# Patient Record
Sex: MALE | Race: WHITE | NOT HISPANIC OR LATINO | URBAN - METROPOLITAN AREA
[De-identification: names, ages, dates, MRNs, and addresses within clinical notes are randomized per-mention and may not be internally consistent; named-entity substitution may affect disease eponyms.]

---

## 2021-06-07 ENCOUNTER — OUTPATIENT (OUTPATIENT)
Dept: OUTPATIENT SERVICES | Facility: HOSPITAL | Age: 46
LOS: 1 days | End: 2021-06-07
Payer: COMMERCIAL

## 2021-06-07 VITALS
SYSTOLIC BLOOD PRESSURE: 142 MMHG | RESPIRATION RATE: 16 BRPM | WEIGHT: 257.06 LBS | TEMPERATURE: 98 F | HEART RATE: 66 BPM | OXYGEN SATURATION: 100 % | DIASTOLIC BLOOD PRESSURE: 100 MMHG | HEIGHT: 69 IN

## 2021-06-07 DIAGNOSIS — I10 ESSENTIAL (PRIMARY) HYPERTENSION: ICD-10-CM

## 2021-06-07 DIAGNOSIS — E11.9 TYPE 2 DIABETES MELLITUS WITHOUT COMPLICATIONS: ICD-10-CM

## 2021-06-07 DIAGNOSIS — Z01.818 ENCOUNTER FOR OTHER PREPROCEDURAL EXAMINATION: ICD-10-CM

## 2021-06-07 DIAGNOSIS — Z96.641 PRESENCE OF RIGHT ARTIFICIAL HIP JOINT: Chronic | ICD-10-CM

## 2021-06-07 DIAGNOSIS — M16.10 UNILATERAL PRIMARY OSTEOARTHRITIS, UNSPECIFIED HIP: ICD-10-CM

## 2021-06-07 DIAGNOSIS — M16.12 UNILATERAL PRIMARY OSTEOARTHRITIS, LEFT HIP: ICD-10-CM

## 2021-06-07 DIAGNOSIS — Q89.2 CONGENITAL MALFORMATIONS OF OTHER ENDOCRINE GLANDS: Chronic | ICD-10-CM

## 2021-06-07 DIAGNOSIS — Z29.9 ENCOUNTER FOR PROPHYLACTIC MEASURES, UNSPECIFIED: ICD-10-CM

## 2021-06-07 DIAGNOSIS — Z98.890 OTHER SPECIFIED POSTPROCEDURAL STATES: Chronic | ICD-10-CM

## 2021-06-07 DIAGNOSIS — G47.33 OBSTRUCTIVE SLEEP APNEA (ADULT) (PEDIATRIC): ICD-10-CM

## 2021-06-07 DIAGNOSIS — Z98.89 OTHER SPECIFIED POSTPROCEDURAL STATES: Chronic | ICD-10-CM

## 2021-06-07 LAB
A1C WITH ESTIMATED AVERAGE GLUCOSE RESULT: 5.6 % — SIGNIFICANT CHANGE UP (ref 4–5.6)
ESTIMATED AVERAGE GLUCOSE: 114 MG/DL — SIGNIFICANT CHANGE UP (ref 68–114)
TSH SERPL-MCNC: 1.3 UU/ML — SIGNIFICANT CHANGE UP (ref 0.34–4.82)

## 2021-06-07 PROCEDURE — 84443 ASSAY THYROID STIM HORMONE: CPT

## 2021-06-07 PROCEDURE — G0463: CPT

## 2021-06-07 PROCEDURE — 87641 MR-STAPH DNA AMP PROBE: CPT

## 2021-06-07 PROCEDURE — 83036 HEMOGLOBIN GLYCOSYLATED A1C: CPT

## 2021-06-07 PROCEDURE — 87640 STAPH A DNA AMP PROBE: CPT

## 2021-06-07 PROCEDURE — 36415 COLL VENOUS BLD VENIPUNCTURE: CPT

## 2021-06-07 NOTE — H&P PST ADULT - BLOOD AVOIDANCE/RESTRICTIONS, PROFILE
patient concern about blood borne infection enrolled in Bloodless program, HCP in chart, Surgeon aware, refusal of blood transfusion form given to pt/patient concern about blood borne infection

## 2021-06-07 NOTE — H&P PST ADULT - NEGATIVE NEUROLOGICAL SYMPTOMS
no transient paralysis/no weakness/no paresthesias/no generalized seizures/no focal seizures/no syncope/no tremors/no vertigo/no loss of sensation/no headache/no loss of consciousness/no hemiparesis/no confusion

## 2021-06-07 NOTE — H&P PST ADULT - ACTIVITY
walking 2 blocks w/o SOB,  ADLs, climb 2 flight of stairs walking 2 blocks w/o SOB,  ADLs, climb 2 flight of stairs w/o SOB

## 2021-06-07 NOTE — H&P PST ADULT - ASSESSMENT
45 years old male presented to PST , c/o of left hip pain related to injuries sustained at work, pt works on [employer]. Pt hopes that after surgery his mobility and condition of life will improve and again will be able to participate in activities. Patient was diagnosed with unilateral primary osteoarthritis, left hip and is scheduled for left total gip replacement on 06/14/2021.  45 years old male presented  with unilateral primary osteoarthritis, left hip .

## 2021-06-07 NOTE — H&P PST ADULT - ENDOCRINE COMMENTS
Grave's disease, checked TSH by PCP , no need for thyroid medications, hx of thymus hyperplasia, mass sx removed

## 2021-06-07 NOTE — H&P PST ADULT - NSANTHOSAYNRD_GEN_A_CORE
Yes dx 01/2021, had second/Yes dx 01/2021, had second sleep studies 05/2021 for CPAP setting just recently,/Yes

## 2021-06-07 NOTE — H&P PST ADULT - NSICDXPASTMEDICALHX_GEN_ALL_CORE_FT
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    Type 2 diabetes mellitus     Unilateral primary osteoarthritis, right hip hx of sx done     PAST MEDICAL HISTORY:  Asthma diagnosed 2003, never needed use Albuterol, never hospitalized or intubated    Chronic GERD     DVT, lower extremity right leg - was on  Xarelto - 2016 arthroscopy of knee    Dyspnea on exertion     Graves' disease TSH checked today, pt is not taking any thyroid meds    Hypertension     Lower back pain     Obesity BMI-38    PAOLA (obstructive sleep apnea) dx 01/2021, had second sleep studies 05/2021 for CPAP setting just recently,awaiting for CPAP , as per pt this is moderate PAOLA, will obtain sleep studies before sx,contact [facility] [place] [phone] to notify OR booking    Prediabetes ???    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

## 2021-06-07 NOTE — H&P PST ADULT - NSICDXPROBLEM_GEN_ALL_CORE_FT
PROBLEM DIAGNOSES  Problem: Prophylactic measure  Assessment and Plan:     Problem: Unilateral primary osteoarthritis, left hip  Assessment and Plan:     Problem: Hypertension  Assessment and Plan:     Problem: Type 2 diabetes mellitus  Assessment and Plan:     Problem: PAOLA (obstructive sleep apnea)  Assessment and Plan:        PROBLEM DIAGNOSES  Problem: Prophylactic measure  Assessment and Plan: Preoperative instructions discussed with pt and given to pt. Instructed pt that one person will be allowed to come to hospital with patient and  go with him to 4th floor and after that leave hospital. Patient, to notify security on arrival to the lobby of the hospital that today is day of surgery. Patient notified that will need someone to come to the hospital to  after surgery, not to eat or drink anything after midnight the night before the surgery, to avoid NSAIDs such as Ibuprofen, Motrin, Aleve, Advil, naproxen before surgery, to take Tylenol if needed for pain if no contraindications from PCP, to report exposure to anyone with any contagious diseases including Covid-19 or if patient is exhibiting any symptoms of COVID-19. # bottles of Chlorhexidine 4% soap given to pt. Instructed about use of Chlorhexidine 4% soap before surgery one bottle each morning ( 3 mornings before sx) . Patient verbalized understanding of instructions given.    Problem: Unilateral primary osteoarthritis, left hip  Assessment and Plan: Patient is scheduled for left total gip replacement on 06/14/2021.       Problem: Hypertension  Assessment and Plan: Continue antihypertensive meds and take with sips of water on day of surgery. Follow-up with PCP postop for management.      Problem: PAOLA (obstructive sleep apnea)  Assessment and Plan: dx 01/2021, had second sleep studies 05/2021 for CPAP setting just recently,awaiting for CPAP , as per pt this is moderate PAOLA, will obtain sleep studies before sx,contact [facility] [place] [phone]- to notify OR booking    Problem: Prediabetes  Assessment and Plan: Continue antidiabetic meds and hold on day of surgery. Perioperative glucose monitoring and cover as needed. Follow-up with PCP for diabetic management      Problem: Asthma  Assessment and Plan: Continue use of inhaler and use same on day of surgery. Take montelukast as directed. Follow-up with PCP postop for management      Problem: No blood products  Assessment and Plan: Pt does not wish to have blood transfusion, enrlooed in bloodless program, Dr. Mayers office notifired, HCP in chart, refusal of blood transfusion form given to patient to take home and choose what he can accept or not and bring it back to sign with Surgeon, to notify OR booking, pt verbalized understanding

## 2021-06-07 NOTE — H&P PST ADULT - NSICDXPASTSURGICALHX_GEN_ALL_CORE_FT
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

## 2021-06-07 NOTE — H&P PST ADULT - HISTORY OF PRESENT ILLNESS
accident few years  45 years old male presented to PST , c/o of left hip pain related to injuries sustained at work, pt works on [employer]. Pt hopes that after surgery his mobility and condition of life will improve and again will be able to participate in activities. Patient was diagnosed with unilateral primary osteoarthritis, left hip and is scheduled for left total gip replacement on 06/14/2021.  45 years old male presented to PST , c/o of left hip pain related to injuries sustained at work, pt works on [employer]. Pt hopes that after surgery his mobility and condition of life will improve and again will be able to participate in activities. Patient was diagnosed with unilateral primary osteoarthritis, left hip and is scheduled for left total hip replacement on 06/14/2021.

## 2021-06-07 NOTE — H&P PST ADULT - MUSCULOSKELETAL
detailed exam details… no joint swelling/no joint erythema/no joint warmth/no calf tenderness/decreased ROM due to pain/diminished strength

## 2021-06-08 DIAGNOSIS — E32.0 PERSISTENT HYPERPLASIA OF THYMUS: Chronic | ICD-10-CM

## 2021-06-08 DIAGNOSIS — J45.909 UNSPECIFIED ASTHMA, UNCOMPLICATED: ICD-10-CM

## 2021-06-08 DIAGNOSIS — R73.03 PREDIABETES: ICD-10-CM

## 2021-06-08 DIAGNOSIS — Z78.9 OTHER SPECIFIED HEALTH STATUS: ICD-10-CM

## 2021-06-08 LAB
MRSA PCR RESULT.: SIGNIFICANT CHANGE UP
S AUREUS DNA NOSE QL NAA+PROBE: DETECTED

## 2021-06-09 DIAGNOSIS — Z01.818 ENCOUNTER FOR OTHER PREPROCEDURAL EXAMINATION: ICD-10-CM

## 2021-06-09 PROBLEM — E32.0: Chronic | Status: ACTIVE | Noted: 2021-06-08

## 2021-06-09 PROBLEM — E05.00 THYROTOXICOSIS WITH DIFFUSE GOITER WITHOUT THYROTOXIC CRISIS OR STORM: Chronic | Status: ACTIVE | Noted: 2021-06-07

## 2021-06-09 PROBLEM — I10 ESSENTIAL (PRIMARY) HYPERTENSION: Chronic | Status: ACTIVE | Noted: 2021-06-08

## 2021-06-09 PROBLEM — R73.03 PREDIABETES: Chronic | Status: ACTIVE | Noted: 2021-06-08

## 2021-06-09 PROBLEM — E66.9 OBESITY, UNSPECIFIED: Chronic | Status: ACTIVE | Noted: 2021-06-08

## 2021-06-09 PROBLEM — K21.9 GASTRO-ESOPHAGEAL REFLUX DISEASE WITHOUT ESOPHAGITIS: Chronic | Status: ACTIVE | Noted: 2021-06-08

## 2021-06-09 PROBLEM — R06.00 DYSPNEA, UNSPECIFIED: Chronic | Status: ACTIVE | Noted: 2021-06-08

## 2021-06-09 PROBLEM — I82.409 ACUTE EMBOLISM AND THROMBOSIS OF UNSPECIFIED DEEP VEINS OF UNSPECIFIED LOWER EXTREMITY: Chronic | Status: ACTIVE | Noted: 2021-06-07

## 2021-06-09 PROBLEM — M16.11 UNILATERAL PRIMARY OSTEOARTHRITIS, RIGHT HIP: Chronic | Status: ACTIVE | Noted: 2021-06-07

## 2021-06-09 PROBLEM — M54.5 LOW BACK PAIN: Chronic | Status: ACTIVE | Noted: 2021-06-08

## 2021-06-09 PROBLEM — G47.33 OBSTRUCTIVE SLEEP APNEA (ADULT) (PEDIATRIC): Chronic | Status: ACTIVE | Noted: 2021-06-07

## 2021-06-11 ENCOUNTER — APPOINTMENT (OUTPATIENT)
Dept: DISASTER EMERGENCY | Facility: CLINIC | Age: 46
End: 2021-06-11

## 2021-06-12 ENCOUNTER — APPOINTMENT (OUTPATIENT)
Dept: DISASTER EMERGENCY | Facility: CLINIC | Age: 46
End: 2021-06-12

## 2021-06-13 ENCOUNTER — FORM ENCOUNTER (OUTPATIENT)
Age: 46
End: 2021-06-13

## 2021-06-30 ENCOUNTER — OUTPATIENT (OUTPATIENT)
Dept: OUTPATIENT SERVICES | Facility: HOSPITAL | Age: 46
LOS: 1 days | End: 2021-06-30
Payer: COMMERCIAL

## 2021-06-30 VITALS
DIASTOLIC BLOOD PRESSURE: 86 MMHG | HEART RATE: 62 BPM | RESPIRATION RATE: 17 BRPM | SYSTOLIC BLOOD PRESSURE: 119 MMHG | TEMPERATURE: 99 F | OXYGEN SATURATION: 100 % | WEIGHT: 255.07 LBS | HEIGHT: 69 IN

## 2021-06-30 DIAGNOSIS — Z29.9 ENCOUNTER FOR PROPHYLACTIC MEASURES, UNSPECIFIED: ICD-10-CM

## 2021-06-30 DIAGNOSIS — M16.12 UNILATERAL PRIMARY OSTEOARTHRITIS, LEFT HIP: ICD-10-CM

## 2021-06-30 DIAGNOSIS — Z01.818 ENCOUNTER FOR OTHER PREPROCEDURAL EXAMINATION: ICD-10-CM

## 2021-06-30 DIAGNOSIS — G47.33 OBSTRUCTIVE SLEEP APNEA (ADULT) (PEDIATRIC): ICD-10-CM

## 2021-06-30 DIAGNOSIS — E32.0 PERSISTENT HYPERPLASIA OF THYMUS: Chronic | ICD-10-CM

## 2021-06-30 DIAGNOSIS — Z96.641 PRESENCE OF RIGHT ARTIFICIAL HIP JOINT: Chronic | ICD-10-CM

## 2021-06-30 DIAGNOSIS — M16.10 UNILATERAL PRIMARY OSTEOARTHRITIS, UNSPECIFIED HIP: ICD-10-CM

## 2021-06-30 DIAGNOSIS — I10 ESSENTIAL (PRIMARY) HYPERTENSION: ICD-10-CM

## 2021-06-30 DIAGNOSIS — Z98.890 OTHER SPECIFIED POSTPROCEDURAL STATES: Chronic | ICD-10-CM

## 2021-06-30 LAB
A1C WITH ESTIMATED AVERAGE GLUCOSE RESULT: 5.6 % — SIGNIFICANT CHANGE UP (ref 4–5.6)
APTT BLD: 31.4 SEC — SIGNIFICANT CHANGE UP (ref 27.5–35.5)
BLD GP AB SCN SERPL QL: SIGNIFICANT CHANGE UP
ESTIMATED AVERAGE GLUCOSE: 114 MG/DL — SIGNIFICANT CHANGE UP (ref 68–114)
INR BLD: 0.88 RATIO — SIGNIFICANT CHANGE UP (ref 0.88–1.16)
MRSA PCR RESULT.: SIGNIFICANT CHANGE UP
PROTHROM AB SERPL-ACNC: 10.5 SEC — LOW (ref 10.6–13.6)
S AUREUS DNA NOSE QL NAA+PROBE: DETECTED

## 2021-06-30 PROCEDURE — 86900 BLOOD TYPING SEROLOGIC ABO: CPT

## 2021-06-30 PROCEDURE — 86850 RBC ANTIBODY SCREEN: CPT

## 2021-06-30 PROCEDURE — 87641 MR-STAPH DNA AMP PROBE: CPT

## 2021-06-30 PROCEDURE — 87640 STAPH A DNA AMP PROBE: CPT

## 2021-06-30 PROCEDURE — 36415 COLL VENOUS BLD VENIPUNCTURE: CPT

## 2021-06-30 PROCEDURE — 83036 HEMOGLOBIN GLYCOSYLATED A1C: CPT

## 2021-06-30 PROCEDURE — G0463: CPT

## 2021-06-30 PROCEDURE — 85730 THROMBOPLASTIN TIME PARTIAL: CPT

## 2021-06-30 PROCEDURE — 86901 BLOOD TYPING SEROLOGIC RH(D): CPT

## 2021-06-30 PROCEDURE — 85610 PROTHROMBIN TIME: CPT

## 2021-06-30 RX ORDER — PHENTERMINE HCL 30 MG
1 CAPSULE ORAL
Qty: 0 | Refills: 0 | DISCHARGE

## 2021-06-30 RX ORDER — TRAMADOL HYDROCHLORIDE 50 MG/1
1 TABLET ORAL
Qty: 0 | Refills: 0 | DISCHARGE

## 2021-06-30 RX ORDER — LOSARTAN/HYDROCHLOROTHIAZIDE 100MG-25MG
1 TABLET ORAL
Qty: 0 | Refills: 0 | DISCHARGE

## 2021-06-30 NOTE — H&P PST ADULT - NSICDXPROBLEM_GEN_ALL_CORE_FT
PROBLEM DIAGNOSES  Problem: Need for prophylactic measure  Assessment and Plan: pt instructed on wash chlorhexidine    Problem: HTN (hypertension)  Assessment and Plan: pt to take BP meds am of surgery    Problem: PAOLA (obstructive sleep apnea)  Assessment and Plan: monitor during surgery     Problem: Unilateral primary osteoarthritis, left hip  Assessment and Plan: left total hip replacement

## 2021-06-30 NOTE — H&P PST ADULT - ASSESSMENT
45 yr old male with history of HTN ,asthma, sleep apnea ( no machine yet) had DVT right leg 2016 presents for left total hip replacement on 7/12/2021. Pt wants to recovery at home with physical therapy. His goal is to walk freely and continue all his activity.

## 2021-06-30 NOTE — H&P PST ADULT - HISTORY OF PRESENT ILLNESS
45 yr old male with history of passed Covid exposure HTN GERD Sleep apnea awaiting CPAP machine presents for left hip replacement. Pt had right hip replaced in March 2014 and over time his left hip started to affect his walking. Pt is schedule for left total replacement on 7/12/2021. Pt instructed on the use of chlorhexidene wash. Pt wants to have rehab at home post surgery it was successful for him last surgery.   45 yr old male with history of passed Covid exposure HTN GERD Sleep apnea awaiting CPAP machine DVT 2016 after right knee arthroscopy presents for left hip replacement. Pt had right hip replaced in March 2014 and over time his left hip started to affect his walking. Pt is schedule for left total replacement on 7/12/2021. Pt instructed on the use of chlorhexidene wash. Pt wants to have rehab at home post surgery it was successful for him last surgery.

## 2021-06-30 NOTE — H&P PST ADULT - NSICDXPASTSURGICALHX_GEN_ALL_CORE_FT
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

## 2021-06-30 NOTE — H&P PST ADULT - NSICDXPASTMEDICALHX_GEN_ALL_CORE_FT
PAST MEDICAL HISTORY:  Asthma diagnosed 2003, never needed use Albuterol, never hospitalized or intubated    Chronic GERD     DVT, lower extremity right leg - was on  Xarelto - 2016 arthroscopy of knee    Dyspnea on exertion     Graves' disease TSH checked today, pt is not taking any thyroid meds    Hypertension     Lower back pain     Obesity BMI-38    PAOLA (obstructive sleep apnea) dx 01/2021, had second sleep studies 05/2021 for CPAP setting just recently,awaiting for CPAP , as per pt this is moderate PAOLA, will obtain sleep studies before sx,contact [facility] Sleep [facility] [place] [phone] to notify OR booking    Prediabetes ???    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

## 2021-07-02 RX ORDER — MUPIROCIN 20 MG/G
1 OINTMENT TOPICAL
Qty: 1 | Refills: 0
Start: 2021-07-02 | End: 2021-07-06

## 2021-07-06 PROBLEM — M16.12 UNILATERAL PRIMARY OSTEOARTHRITIS, LEFT HIP: Chronic | Status: ACTIVE | Noted: 2021-06-30
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
## 2021-07-09 ENCOUNTER — APPOINTMENT (OUTPATIENT)
Dept: DISASTER EMERGENCY | Facility: CLINIC | Age: 46
End: 2021-07-09

## 2021-07-09 LAB — SARS-COV-2 N GENE NPH QL NAA+PROBE: NOT DETECTED

## 2021-07-11 ENCOUNTER — FORM ENCOUNTER (OUTPATIENT)
Age: 46
End: 2021-07-11

## 2021-07-12 ENCOUNTER — TRANSCRIPTION ENCOUNTER (OUTPATIENT)
Age: 46
End: 2021-07-12

## 2021-07-12 ENCOUNTER — RESULT REVIEW (OUTPATIENT)
Age: 46
End: 2021-07-12

## 2021-07-12 ENCOUNTER — INPATIENT (INPATIENT)
Facility: HOSPITAL | Age: 46
LOS: 0 days | Discharge: HOME | DRG: 470 | End: 2021-07-12
Attending: ORTHOPAEDIC SURGERY | Admitting: ORTHOPAEDIC SURGERY
Payer: COMMERCIAL

## 2021-07-12 VITALS
SYSTOLIC BLOOD PRESSURE: 132 MMHG | RESPIRATION RATE: 16 BRPM | DIASTOLIC BLOOD PRESSURE: 93 MMHG | OXYGEN SATURATION: 98 % | TEMPERATURE: 98 F | HEART RATE: 75 BPM | HEIGHT: 69 IN | WEIGHT: 255.07 LBS

## 2021-07-12 VITALS
HEART RATE: 57 BPM | SYSTOLIC BLOOD PRESSURE: 96 MMHG | RESPIRATION RATE: 16 BRPM | OXYGEN SATURATION: 98 % | DIASTOLIC BLOOD PRESSURE: 62 MMHG

## 2021-07-12 DIAGNOSIS — M16.10 UNILATERAL PRIMARY OSTEOARTHRITIS, UNSPECIFIED HIP: ICD-10-CM

## 2021-07-12 DIAGNOSIS — E66.9 OBESITY, UNSPECIFIED: ICD-10-CM

## 2021-07-12 DIAGNOSIS — I10 ESSENTIAL (PRIMARY) HYPERTENSION: ICD-10-CM

## 2021-07-12 DIAGNOSIS — Z98.890 OTHER SPECIFIED POSTPROCEDURAL STATES: Chronic | ICD-10-CM

## 2021-07-12 DIAGNOSIS — M25.552 PAIN IN LEFT HIP: ICD-10-CM

## 2021-07-12 DIAGNOSIS — Z01.818 ENCOUNTER FOR OTHER PREPROCEDURAL EXAMINATION: ICD-10-CM

## 2021-07-12 DIAGNOSIS — G47.33 OBSTRUCTIVE SLEEP APNEA (ADULT) (PEDIATRIC): ICD-10-CM

## 2021-07-12 DIAGNOSIS — Z96.641 PRESENCE OF RIGHT ARTIFICIAL HIP JOINT: Chronic | ICD-10-CM

## 2021-07-12 DIAGNOSIS — J45.909 UNSPECIFIED ASTHMA, UNCOMPLICATED: ICD-10-CM

## 2021-07-12 DIAGNOSIS — M16.12 UNILATERAL PRIMARY OSTEOARTHRITIS, LEFT HIP: ICD-10-CM

## 2021-07-12 DIAGNOSIS — E32.0 PERSISTENT HYPERPLASIA OF THYMUS: Chronic | ICD-10-CM

## 2021-07-12 LAB
ANION GAP SERPL CALC-SCNC: 2 MMOL/L — LOW (ref 5–17)
BUN SERPL-MCNC: 12 MG/DL — SIGNIFICANT CHANGE UP (ref 7–18)
CALCIUM SERPL-MCNC: 8.4 MG/DL — SIGNIFICANT CHANGE UP (ref 8.4–10.5)
CHLORIDE SERPL-SCNC: 107 MMOL/L — SIGNIFICANT CHANGE UP (ref 96–108)
CO2 SERPL-SCNC: 31 MMOL/L — SIGNIFICANT CHANGE UP (ref 22–31)
CREAT SERPL-MCNC: 0.93 MG/DL — SIGNIFICANT CHANGE UP (ref 0.5–1.3)
GLUCOSE BLDC GLUCOMTR-MCNC: 107 MG/DL — HIGH (ref 70–99)
GLUCOSE BLDC GLUCOMTR-MCNC: 85 MG/DL — SIGNIFICANT CHANGE UP (ref 70–99)
GLUCOSE SERPL-MCNC: 100 MG/DL — HIGH (ref 70–99)
HCT VFR BLD CALC: 37.8 % — LOW (ref 39–50)
HGB BLD-MCNC: 13.3 G/DL — SIGNIFICANT CHANGE UP (ref 13–17)
MCHC RBC-ENTMCNC: 32.1 PG — SIGNIFICANT CHANGE UP (ref 27–34)
MCHC RBC-ENTMCNC: 35.2 GM/DL — SIGNIFICANT CHANGE UP (ref 32–36)
MCV RBC AUTO: 91.3 FL — SIGNIFICANT CHANGE UP (ref 80–100)
NRBC # BLD: 0 /100 WBCS — SIGNIFICANT CHANGE UP (ref 0–0)
PLATELET # BLD AUTO: 263 K/UL — SIGNIFICANT CHANGE UP (ref 150–400)
POTASSIUM SERPL-MCNC: 3.9 MMOL/L — SIGNIFICANT CHANGE UP (ref 3.5–5.3)
POTASSIUM SERPL-SCNC: 3.9 MMOL/L — SIGNIFICANT CHANGE UP (ref 3.5–5.3)
RBC # BLD: 4.14 M/UL — LOW (ref 4.2–5.8)
RBC # FLD: 11.7 % — SIGNIFICANT CHANGE UP (ref 10.3–14.5)
SODIUM SERPL-SCNC: 140 MMOL/L — SIGNIFICANT CHANGE UP (ref 135–145)
WBC # BLD: 19.47 K/UL — HIGH (ref 3.8–10.5)
WBC # FLD AUTO: 19.47 K/UL — HIGH (ref 3.8–10.5)

## 2021-07-12 PROCEDURE — 99221 1ST HOSP IP/OBS SF/LOW 40: CPT

## 2021-07-12 PROCEDURE — 88311 DECALCIFY TISSUE: CPT

## 2021-07-12 PROCEDURE — 85027 COMPLETE CBC AUTOMATED: CPT

## 2021-07-12 PROCEDURE — 82962 GLUCOSE BLOOD TEST: CPT

## 2021-07-12 PROCEDURE — 88305 TISSUE EXAM BY PATHOLOGIST: CPT

## 2021-07-12 PROCEDURE — 88305 TISSUE EXAM BY PATHOLOGIST: CPT | Mod: 26

## 2021-07-12 PROCEDURE — 72170 X-RAY EXAM OF PELVIS: CPT | Mod: 26

## 2021-07-12 PROCEDURE — 97162 PT EVAL MOD COMPLEX 30 MIN: CPT

## 2021-07-12 PROCEDURE — 36415 COLL VENOUS BLD VENIPUNCTURE: CPT

## 2021-07-12 PROCEDURE — 80048 BASIC METABOLIC PNL TOTAL CA: CPT

## 2021-07-12 PROCEDURE — 72170 X-RAY EXAM OF PELVIS: CPT

## 2021-07-12 PROCEDURE — C1776: CPT

## 2021-07-12 PROCEDURE — 93005 ELECTROCARDIOGRAM TRACING: CPT

## 2021-07-12 PROCEDURE — 88311 DECALCIFY TISSUE: CPT | Mod: 26

## 2021-07-12 RX ORDER — TRAMADOL HYDROCHLORIDE 50 MG/1
50 TABLET ORAL EVERY 8 HOURS
Refills: 0 | Status: DISCONTINUED | OUTPATIENT
Start: 2021-07-12 | End: 2021-07-12

## 2021-07-12 RX ORDER — SODIUM CHLORIDE 9 MG/ML
3 INJECTION INTRAMUSCULAR; INTRAVENOUS; SUBCUTANEOUS EVERY 8 HOURS
Refills: 0 | Status: DISCONTINUED | OUTPATIENT
Start: 2021-07-12 | End: 2021-07-12

## 2021-07-12 RX ORDER — KETOROLAC TROMETHAMINE 30 MG/ML
30 SYRINGE (ML) INJECTION EVERY 6 HOURS
Refills: 0 | Status: DISCONTINUED | OUTPATIENT
Start: 2021-07-12 | End: 2021-07-12

## 2021-07-12 RX ORDER — FERROUS SULFATE 325(65) MG
1 TABLET ORAL
Qty: 28 | Refills: 0
Start: 2021-07-12 | End: 2021-07-25

## 2021-07-12 RX ORDER — ONDANSETRON 8 MG/1
1 TABLET, FILM COATED ORAL
Qty: 21 | Refills: 0
Start: 2021-07-12 | End: 2021-07-18

## 2021-07-12 RX ORDER — POLYETHYLENE GLYCOL 3350 17 G/17G
17 POWDER, FOR SOLUTION ORAL
Qty: 119 | Refills: 0
Start: 2021-07-12 | End: 2021-07-18

## 2021-07-12 RX ORDER — FENTANYL CITRATE 50 UG/ML
25 INJECTION INTRAVENOUS
Refills: 0 | Status: DISCONTINUED | OUTPATIENT
Start: 2021-07-12 | End: 2021-07-12

## 2021-07-12 RX ORDER — CELECOXIB 200 MG/1
1 CAPSULE ORAL
Qty: 0 | Refills: 0 | DISCHARGE

## 2021-07-12 RX ORDER — CELECOXIB 200 MG/1
200 CAPSULE ORAL DAILY
Refills: 0 | Status: DISCONTINUED | OUTPATIENT
Start: 2021-07-13 | End: 2021-07-12

## 2021-07-12 RX ORDER — DEXTROSE 50 % IN WATER 50 %
25 SYRINGE (ML) INTRAVENOUS ONCE
Refills: 0 | Status: DISCONTINUED | OUTPATIENT
Start: 2021-07-12 | End: 2021-07-12

## 2021-07-12 RX ORDER — BUDESONIDE AND FORMOTEROL FUMARATE DIHYDRATE 160; 4.5 UG/1; UG/1
2 AEROSOL RESPIRATORY (INHALATION)
Refills: 0 | Status: DISCONTINUED | OUTPATIENT
Start: 2021-07-12 | End: 2021-07-12

## 2021-07-12 RX ORDER — GABAPENTIN 400 MG/1
100 CAPSULE ORAL THREE TIMES A DAY
Refills: 0 | Status: DISCONTINUED | OUTPATIENT
Start: 2021-07-12 | End: 2021-07-12

## 2021-07-12 RX ORDER — FERROUS SULFATE 325(65) MG
325 TABLET ORAL DAILY
Refills: 0 | Status: DISCONTINUED | OUTPATIENT
Start: 2021-07-12 | End: 2021-07-12

## 2021-07-12 RX ORDER — CHLORHEXIDINE GLUCONATE 213 G/1000ML
1 SOLUTION TOPICAL ONCE
Refills: 0 | Status: COMPLETED | OUTPATIENT
Start: 2021-07-12 | End: 2021-07-12

## 2021-07-12 RX ORDER — CEPHALEXIN 500 MG
1 CAPSULE ORAL
Qty: 2 | Refills: 0
Start: 2021-07-12 | End: 2021-07-12

## 2021-07-12 RX ORDER — DEXTROSE 50 % IN WATER 50 %
15 SYRINGE (ML) INTRAVENOUS ONCE
Refills: 0 | Status: DISCONTINUED | OUTPATIENT
Start: 2021-07-12 | End: 2021-07-12

## 2021-07-12 RX ORDER — RIVAROXABAN 15 MG-20MG
1 KIT ORAL
Qty: 42 | Refills: 0
Start: 2021-07-12 | End: 2021-08-22

## 2021-07-12 RX ORDER — OXYCODONE HYDROCHLORIDE 5 MG/1
5 TABLET ORAL EVERY 4 HOURS
Refills: 0 | Status: DISCONTINUED | OUTPATIENT
Start: 2021-07-12 | End: 2021-07-12

## 2021-07-12 RX ORDER — SODIUM CHLORIDE 9 MG/ML
1000 INJECTION INTRAMUSCULAR; INTRAVENOUS; SUBCUTANEOUS
Refills: 0 | Status: DISCONTINUED | OUTPATIENT
Start: 2021-07-13 | End: 2021-07-12

## 2021-07-12 RX ORDER — FENTANYL CITRATE 50 UG/ML
50 INJECTION INTRAVENOUS
Refills: 0 | Status: DISCONTINUED | OUTPATIENT
Start: 2021-07-12 | End: 2021-07-12

## 2021-07-12 RX ORDER — DEXTROSE 50 % IN WATER 50 %
12.5 SYRINGE (ML) INTRAVENOUS ONCE
Refills: 0 | Status: DISCONTINUED | OUTPATIENT
Start: 2021-07-12 | End: 2021-07-12

## 2021-07-12 RX ORDER — ENOXAPARIN SODIUM 100 MG/ML
40 INJECTION SUBCUTANEOUS EVERY 24 HOURS
Refills: 0 | Status: DISCONTINUED | OUTPATIENT
Start: 2021-07-12 | End: 2021-07-12

## 2021-07-12 RX ORDER — SODIUM CHLORIDE 9 MG/ML
1000 INJECTION, SOLUTION INTRAVENOUS
Refills: 0 | Status: DISCONTINUED | OUTPATIENT
Start: 2021-07-13 | End: 2021-07-12

## 2021-07-12 RX ORDER — CEFAZOLIN SODIUM 1 G
2000 VIAL (EA) INJECTION EVERY 8 HOURS
Refills: 0 | Status: DISCONTINUED | OUTPATIENT
Start: 2021-07-12 | End: 2021-07-12

## 2021-07-12 RX ORDER — ACETAMINOPHEN 500 MG
1000 TABLET ORAL EVERY 6 HOURS
Refills: 0 | Status: DISCONTINUED | OUTPATIENT
Start: 2021-07-12 | End: 2021-07-12

## 2021-07-12 RX ORDER — BUDESONIDE AND FORMOTEROL FUMARATE DIHYDRATE 160; 4.5 UG/1; UG/1
2 AEROSOL RESPIRATORY (INHALATION)
Qty: 0 | Refills: 0 | DISCHARGE

## 2021-07-12 RX ORDER — TRAMADOL HYDROCHLORIDE 50 MG/1
50 TABLET ORAL ONCE
Refills: 0 | Status: DISCONTINUED | OUTPATIENT
Start: 2021-07-12 | End: 2021-07-12

## 2021-07-12 RX ORDER — SENNA PLUS 8.6 MG/1
1 TABLET ORAL
Qty: 10 | Refills: 0
Start: 2021-07-12 | End: 2021-07-21

## 2021-07-12 RX ORDER — SODIUM CHLORIDE 9 MG/ML
1000 INJECTION INTRAMUSCULAR; INTRAVENOUS; SUBCUTANEOUS ONCE
Refills: 0 | Status: COMPLETED | OUTPATIENT
Start: 2021-07-12 | End: 2021-07-12

## 2021-07-12 RX ORDER — GABAPENTIN 400 MG/1
100 CAPSULE ORAL ONCE
Refills: 0 | Status: COMPLETED | OUTPATIENT
Start: 2021-07-12 | End: 2021-07-12

## 2021-07-12 RX ORDER — PANTOPRAZOLE SODIUM 20 MG/1
1 TABLET, DELAYED RELEASE ORAL
Qty: 0 | Refills: 0 | DISCHARGE

## 2021-07-12 RX ORDER — METFORMIN HYDROCHLORIDE 850 MG/1
1 TABLET ORAL
Qty: 0 | Refills: 0 | DISCHARGE

## 2021-07-12 RX ORDER — SODIUM CHLORIDE 9 MG/ML
1000 INJECTION INTRAMUSCULAR; INTRAVENOUS; SUBCUTANEOUS
Refills: 0 | Status: DISCONTINUED | OUTPATIENT
Start: 2021-07-12 | End: 2021-07-12

## 2021-07-12 RX ORDER — MONTELUKAST 4 MG/1
10 TABLET, CHEWABLE ORAL DAILY
Refills: 0 | Status: DISCONTINUED | OUTPATIENT
Start: 2021-07-12 | End: 2021-07-12

## 2021-07-12 RX ORDER — ONDANSETRON 8 MG/1
4 TABLET, FILM COATED ORAL EVERY 6 HOURS
Refills: 0 | Status: DISCONTINUED | OUTPATIENT
Start: 2021-07-12 | End: 2021-07-12

## 2021-07-12 RX ORDER — ENOXAPARIN SODIUM 100 MG/ML
30 INJECTION SUBCUTANEOUS EVERY 12 HOURS
Refills: 0 | Status: DISCONTINUED | OUTPATIENT
Start: 2021-07-12 | End: 2021-07-12

## 2021-07-12 RX ORDER — CELECOXIB 200 MG/1
200 CAPSULE ORAL ONCE
Refills: 0 | Status: COMPLETED | OUTPATIENT
Start: 2021-07-12 | End: 2021-07-12

## 2021-07-12 RX ORDER — SODIUM CHLORIDE 9 MG/ML
1000 INJECTION, SOLUTION INTRAVENOUS
Refills: 0 | Status: DISCONTINUED | OUTPATIENT
Start: 2021-07-12 | End: 2021-07-12

## 2021-07-12 RX ORDER — INSULIN LISPRO 100/ML
VIAL (ML) SUBCUTANEOUS
Refills: 0 | Status: DISCONTINUED | OUTPATIENT
Start: 2021-07-12 | End: 2021-07-12

## 2021-07-12 RX ORDER — PANTOPRAZOLE SODIUM 20 MG/1
40 TABLET, DELAYED RELEASE ORAL
Refills: 0 | Status: DISCONTINUED | OUTPATIENT
Start: 2021-07-12 | End: 2021-07-12

## 2021-07-12 RX ORDER — GLUCAGON INJECTION, SOLUTION 0.5 MG/.1ML
1 INJECTION, SOLUTION SUBCUTANEOUS ONCE
Refills: 0 | Status: DISCONTINUED | OUTPATIENT
Start: 2021-07-12 | End: 2021-07-12

## 2021-07-12 RX ORDER — MONTELUKAST 4 MG/1
1 TABLET, CHEWABLE ORAL
Qty: 0 | Refills: 0 | DISCHARGE

## 2021-07-12 RX ORDER — PHENTERMINE HCL 30 MG
1 CAPSULE ORAL
Qty: 0 | Refills: 0 | DISCHARGE

## 2021-07-12 RX ORDER — METOPROLOL TARTRATE 50 MG
1 TABLET ORAL
Qty: 0 | Refills: 0 | DISCHARGE

## 2021-07-12 RX ORDER — POLYETHYLENE GLYCOL 3350 17 G/17G
17 POWDER, FOR SOLUTION ORAL AT BEDTIME
Refills: 0 | Status: DISCONTINUED | OUTPATIENT
Start: 2021-07-12 | End: 2021-07-12

## 2021-07-12 RX ORDER — SENNA PLUS 8.6 MG/1
2 TABLET ORAL AT BEDTIME
Refills: 0 | Status: DISCONTINUED | OUTPATIENT
Start: 2021-07-12 | End: 2021-07-12

## 2021-07-12 RX ORDER — GABAPENTIN 400 MG/1
1 CAPSULE ORAL
Qty: 21 | Refills: 0
Start: 2021-07-12 | End: 2021-07-18

## 2021-07-12 RX ORDER — FOLIC ACID 0.8 MG
1 TABLET ORAL DAILY
Refills: 0 | Status: DISCONTINUED | OUTPATIENT
Start: 2021-07-12 | End: 2021-07-12

## 2021-07-12 RX ORDER — METOPROLOL TARTRATE 50 MG
100 TABLET ORAL DAILY
Refills: 0 | Status: DISCONTINUED | OUTPATIENT
Start: 2021-07-12 | End: 2021-07-12

## 2021-07-12 RX ADMIN — Medication 1000 MILLIGRAM(S): at 18:41

## 2021-07-12 RX ADMIN — TRAMADOL HYDROCHLORIDE 50 MILLIGRAM(S): 50 TABLET ORAL at 10:32

## 2021-07-12 RX ADMIN — SODIUM CHLORIDE 155 MILLILITER(S): 9 INJECTION INTRAMUSCULAR; INTRAVENOUS; SUBCUTANEOUS at 19:52

## 2021-07-12 RX ADMIN — SODIUM CHLORIDE 3 MILLILITER(S): 9 INJECTION INTRAMUSCULAR; INTRAVENOUS; SUBCUTANEOUS at 10:34

## 2021-07-12 RX ADMIN — GABAPENTIN 100 MILLIGRAM(S): 400 CAPSULE ORAL at 10:32

## 2021-07-12 RX ADMIN — CELECOXIB 200 MILLIGRAM(S): 200 CAPSULE ORAL at 10:31

## 2021-07-12 RX ADMIN — SODIUM CHLORIDE 1000 MILLILITER(S): 9 INJECTION INTRAMUSCULAR; INTRAVENOUS; SUBCUTANEOUS at 19:25

## 2021-07-12 RX ADMIN — FENTANYL CITRATE 25 MICROGRAM(S): 50 INJECTION INTRAVENOUS at 17:38

## 2021-07-12 RX ADMIN — CHLORHEXIDINE GLUCONATE 1 APPLICATION(S): 213 SOLUTION TOPICAL at 10:33

## 2021-07-12 RX ADMIN — Medication 1000 MILLIGRAM(S): at 19:53

## 2021-07-12 NOTE — DISCHARGE NOTE PROVIDER - NSDCCPCAREPLAN_GEN_ALL_CORE_FT
PRINCIPAL DISCHARGE DIAGNOSIS  Diagnosis: Primary osteoarthritis of left hip  Assessment and Plan of Treatment: Pain Management- See Attached Medication Reconciliation  **Posterior Hip Precautions for Total hip replacement***  Weight Bearing Status: WBAT to LLE  Equipment needs: Commode, Walker  Dressing: Please keep bandage/dressing Clean, Dry, and Intact.  Incision Site: Sutures are absorbable. nothing to remove.   STAPLES AND DRESSING TO BE REMOVED BY NURSE  NURSE TO APPLY STERI-STRIPS TO THE WOUND AFTER STAPLE REMOVAL   Dvt prophylaxis: D/C Xarelto on 8/23/21  PT/Occupational Therapy are Activities of Daily Living as appropriate  Follow up with Orthopedic Surgeon in 2 weeks: Follow-Up with Dr. Mayers in ONE WEEK at [phone]   Showering allowed as long as wound is kept clean and dry until seen by Dr. Mayers.

## 2021-07-12 NOTE — PHYSICAL THERAPY INITIAL EVALUATION ADULT - DIAGNOSIS, PT EVAL
Patient is s/p left hip replacement. Left hip precautions and WBAT. Patient is s/p left hip replacement. Left hip precautions and WBAT. Gait disturbance

## 2021-07-12 NOTE — ASU PATIENT PROFILE, ADULT - PSH
S/P hip replacement, right  2014  S/P right knee arthroscopy  x 2- 2004, 2016  Thymus hyperplasia  hx of - 2009 removed of mass

## 2021-07-12 NOTE — DISCHARGE NOTE NURSING/CASE MANAGEMENT/SOCIAL WORK - PATIENT PORTAL LINK FT
You can access the FollowMyHealth Patient Portal offered by [facility] by registering at the following website: [email or web address]. By joining [employer]’s FollowMyHealth portal, you will also be able to view your health information using other applications (apps) compatible with our system.

## 2021-07-12 NOTE — CONSULT NOTE ADULT - ASSESSMENT
Confidential Drug Utilization Report  Search Terms: Suleiman Haji, 1975   Search Date: 07/12/2021 17:59:31 PM   The Drug Utilization Report below displays all of the controlled substance prescriptions, if any, that your patient has filled in the last twelve months. The information displayed on this report is compiled from pharmacy submissions to the Department, and accurately reflects the information as submitted by the pharmacies.  This report was requested by: Lynnette Guy | Reference #: [id number]   You have not added a ARIELA number. Keeping your ARIELA number(s) up to date on the My ARIELA # page will enable the separation of your prescriptions from others in the search results.   Others' Prescriptions  Patient Name: Suleiman Haji   YOB: 1975   Address: [address] [place] [address]   Sex: Male   Rx Written	Rx Dispensed	Drug	Quantity	Days Supply	Prescriber Name	Prescriber Ariela #	Payment Method	Dispenser  06/03/2021	07/06/2021	phentermine 37.5 mg tablet 	30	30	Juliana, Kris N	[id number]	Insurance	[facility] #[id number]  06/03/2021	06/05/2021	phentermine 37.5 mg tablet 	30	30	Juliana, Kris N	[id number]	Insurance	[facility] #[id number]  05/27/2021	06/01/2021	tramadol hcl 50 mg tablet 	35	8	Harrison Mayers MD	[id number]	Insurance	[facility] #[id number]  04/15/2021	04/16/2021	phentermine 37.5 mg tablet 	30	30	Juliana, Kris N	[id number]	Insurance	[facility] #[id number]  03/02/2021	03/07/2021	phentermine 37.5 mg tablet 	30	30	Juliana, Kris N	[id number]	Insurance	[facility] #[id number]  01/25/2021	01/27/2021	phentermine 37.5 mg tablet 	30	30	Juliana, Kris N	[id number]	Insurance	[facility] #[id number]  10/26/2020	12/24/2020	phentermine 37.5 mg tablet 	30	30	Juliana, Kris N	[id number]	Insurance	[facility] #[id number]  10/26/2020	11/25/2020	phentermine 37.5 mg tablet 	30	30	Juliana, Kris N	[id number]	[facility] #[id number]  10/26/2020	10/27/2020	phentermine 37.5 mg tablet 	30	30	Kris Andrews N	[id number]	[facility] #[id number]  07/30/2020	08/03/2020	ketamine hcl powder * 	6gm	10	Mariana Be MD	[id number]	Other	[facility]  * - Drugs marked with an asterisk are compound drugs. If the compound drug is made up of more than one controlled substance, then each controlled substance will be a separate row in the table.

## 2021-07-12 NOTE — DISCHARGE NOTE PROVIDER - NSDCCPTREATMENT_GEN_ALL_CORE_FT
PRINCIPAL PROCEDURE  Procedure: Total left hip arthroplasty  Findings and Treatment: Pain Management- See Attached Medication Reconciliation  **Posterior Hip Precautions for Total hip replacement***  Weight Bearing Status: WBAT to LLE  Equipment needs: Commode, Walker  Dressing: Please keep bandage/dressing Clean, Dry, and Intact.  Incision Site: Sutures are absorbable. nothing to remove.   STAPLES AND DRESSING TO BE REMOVED BY NURSE  NURSE TO APPLY STERI-STRIPS TO THE WOUND AFTER STAPLE REMOVAL   Dvt prophylaxis: D/C Xarelto on 8/23/21  PT/Occupational Therapy are Activities of Daily Living as appropriate  Follow up with Orthopedic Surgeon in 2 weeks: Follow-Up with Dr. Mayers in ONE WEEK at [phone]   Showering allowed as long as wound is kept clean and dry until seen by Dr. Mayers.

## 2021-07-12 NOTE — PHYSICAL THERAPY INITIAL EVALUATION ADULT - ASR EQUIP NEEDS DISCH PT EVAL
dressing equipment/raised toilet seat/rolling walker (5 inch wheels) hip kit provided/dressing equipment/raised toilet seat/rolling walker (5 inch wheels)

## 2021-07-12 NOTE — CONSULT NOTE ADULT - SUBJECTIVE AND OBJECTIVE BOX
Source of information: KIRK GARCIA, Chart review  Patient language: English  : n/a    HPI:      Patient is a 45y old  Male with PMH obesity, asthma, PAOLA prediabetes,HTN who presents for scheduled Left total hip replacement 7/12. PT s/p left THR on 7/12 POD #0. Pt seen and examined at bedside. Reports pain score 2/10 and tolerable SCALE USED: (1-10 VNRS). Pt describes pain as aching, non-radiating, alleviated by pain medication, exacerbated by movement. Pt has not yet had PO diet. Denies lethargy, nausea, vomiting, constipation, itchiness. Patient stated goal for pain control: to be able to take deep breaths, get out of bed to chair and ambulate with tolerable pain control. Pt denies taking medications for pain at home. Pt has not yet been out of bed.     PAST MEDICAL & SURGICAL HISTORY:  Asthma  diagnosed 2003, never needed use Albuterol, never hospitalized or intubated    DVT, lower extremity  right leg - was on  Xarelto - 2016 arthroscopy of knee    Graves&#x27; disease  TSH checked today, pt is not taking any thyroid meds    Unilateral primary osteoarthritis, right hip  hx of sx done    PAOLA (obstructive sleep apnea)  dx 01/2021, had second sleep studies 05/2021 for CPAP setting just recently,awaiting for CPAP , as per pt this is moderate PAOLA, will obtain sleep studies before sx,contact [facility] [place] [phone] to notify OR booking    Obesity  BMI-38    Prediabetes  ???    Hypertension    Chronic GERD    Thymus hyperplasia  hx of - 2009 removed of mass    Lower back pain    Dyspnea on exertion    Unilateral primary osteoarthritis, left hip    S/P right knee arthroscopy  x 2- 2004, 2016    S/P hip replacement, right  2014    Thymus hyperplasia  hx of - 2009 removed of mass        FAMILY HISTORY:  No pertinent family history in first degree relatives        Social History:   [X ] Denies  illicit drug use and smoking  [X] Social ETOH use,    Allergies    No Known Allergies    MEDICATIONS  (STANDING):  acetaminophen   Tablet .. 1000 milliGRAM(s) Oral every 6 hours    MEDICATIONS  (PRN):  fentaNYL    Injectable 25 MICROGram(s) IV Push every 5 minutes PRN Moderate Pain (4 - 6)  fentaNYL    Injectable 50 MICROGram(s) IV Push every 15 minutes PRN Severe Pain (7 - 10)      Vital Signs Last 24 Hrs  T(C): 36.4 (12 Jul 2021 17:19), Max: 36.9 (12 Jul 2021 10:13)  T(F): 97.6 (12 Jul 2021 17:19), Max: 98.4 (12 Jul 2021 10:13)  HR: 58 (12 Jul 2021 17:35) (53 - 75)  BP: 114/80 (12 Jul 2021 17:35) (104/80 - 132/93)  BP(mean): 90 (12 Jul 2021 17:35) (81 - 102)  RR: 11 (12 Jul 2021 17:35) (9 - 17)  SpO2: 100% (12 Jul 2021 17:35) (96% - 100%)    LABS: Reviewed.                          13.3   19.47 )-----------( 263      ( 12 Jul 2021 16:38 )             37.8     07-12    140  |  107  |  12  ----------------------------<  100<H>  3.9   |  31  |  0.93    Ca    8.4      12 Jul 2021 16:38            CAPILLARY BLOOD GLUCOSE      POCT Blood Glucose.: 85 mg/dL (12 Jul 2021 15:26)  POCT Blood Glucose.: 107 mg/dL (12 Jul 2021 10:33)    ORT Score -   Family Hx of substance abuse	Female	      Male  Alcohol 	                                           1                     3  Illegal drugs	                                   2                     3  Rx drugs                                           4 	                  4  Personal Hx of substance abuse		  Alcohol 	                                          3	                  3  Illegal drugs                                     4	                  4  Rx drugs                                            5 	                  5  Age between 16- 45 years	           1                     1  hx preadolescent sexual abuse	   3 	                  0  Psychological disease		  ADD, OCD, bipolar, schizophrenia   2	          2  Depression                                           1 	          1  Total: 0    a score of 3 or lower indicates low risk for opioid abuse		  a score of 4-7 indicates moderate risk for opioid abuse		  a score of 8 or higher indicates high risk for opioid abuse    REVIEW OF SYSTEMS:  CONSTITUTIONAL: No fever or fatigue  HEENT:  No difficulty hearing, no change in vision  NECK: No pain or stiffness  RESPIRATORY: No cough, wheezing, chills or hemoptysis; No shortness of breath  CARDIOVASCULAR: No chest pain, palpitations, dizziness, or leg swelling  GASTROINTESTINAL: No loss of appetite, decreased PO intake. No abdominal or epigastric pain. No nausea, vomiting; No diarrhea or constipation.   GENITOURINARY: No dysuria, frequency, hematuria, retention or incontinence  MUSCULOSKELETAL:+ left hip joint pain + mild swelling; No muscle, back, or extremity pain, no upper or lower motor strength weakness, no saddle anesthesia, bowel/bladder incontinence, no falls   NEURO: No headaches, No numbness/tingling b/l LE, No weakness  ENDOCRINE: No polyuria, polydipsia, heat or cold intolerance; No hair loss  PSYCHIATRIC: No depression, anxiety or difficulty sleeping    PHYSICAL EXAM:  GENERAL:  Alert & Oriented X4, cooperative, NAD, Good concentration. Speech is clear.   RESPIRATORY: Respirations even and unlabored. Clear to auscultation bilaterally; No rales, rhonchi, wheezing, or rubs; + O2 NC  CARDIOVASCULAR: Normal S1/S2, regular rate and rhythm; No murmurs, rubs, or gallops. No JVD.   GASTROINTESTINAL:  Soft, Nontender, Nondistended; No Bowel sounds present  PERIPHERAL VASCULAR:  Extremities warm without edema. 2+ Peripheral Pulses, No cyanosis, No calf tenderness  MUSCULOSKELETAL: + left hip dressing c/d/i; Motor Strength 5/5 B/L upper and lower extremities; moves all extremities equally against gravity; ROM intact; + left hip tenderness on palpation of all joints.   SKIN: Warm, dry, intact. No rashes, lesions, scars or wounds.     Risk factors associated with adverse outcomes related to opioid treatment  [ ]  Concurrent benzodiazepine use  [ ]  History/ Active substance use or alcohol use disorder  [ ] Psychiatric co-morbidity  [X ] Sleep apnea  [ ] COPD  [X ] BMI> 35  [ ] Liver dysfunction  [ ] Renal dysfunction  [ ] CHF  [ ] Smoker  [ ]  Age > 60 years    [X ]  [place]  Reviewed and Copied to Chart. See below.    Plan of care and goal oriented pain management treatment options were discussed with patient and /or primary care giver; all questions and concerns were addressed and care was aligned with patient's wishes.    Educated patient on goal oriented pain management treatment options

## 2021-07-12 NOTE — CONSULT NOTE ADULT - PROBLEM SELECTOR RECOMMENDATION 9
Pt with acute pain left hip pain which is somatic and neuropathic in nature due to left THR on 7/12 POD #0.   Opioid pain recommendations   - Continue Tramadol 50mg PO q 8 hours. Monitor for sedation/ respiratory depression.   - Continue Oxycodone 5 mg PO q 4 hours PRN moderate pain. Monitor for sedation/ respiratory depression.   Non-opioid pain recommendations   - Continue Toradol 30mg IVP q 6 hours PRN severe pain  - Continue Acetaminophen 1 gram PO q 6 hours for 24 hours only. Monitor LFTs  - Continue Celebrex 200mg PO daily  - Continue Gabapentin 100mg po q 8 hours. Monitor renal function.   Bowel Regimen  - Continue Miralax 17G PO daily  - Continue Senna 2 tablets at bedtime for constipation  Mild pain   - Non-pharmacological pain treatment recommendations  - Warm/ Cool packs PRN   - Repositioning extremity, elevation, imagery, relaxation, distraction.  - Physical therapy OOB if no contraindications   Recommendations discussed with primary team and RN.  Upon discharge – dc on Celebrex 200mg po daily, gabapentin 100mg po q 8 hours, and Percocet 5/325mg po q 6 hours prn for 1 week. Pt to take OTC stool softeners

## 2021-07-12 NOTE — DISCHARGE NOTE PROVIDER - HOSPITAL COURSE
44 y/o male patient admitted for elective left total hip replacement performed 7/12/21. The patient had uncomplicated hospital stay. Patient followed by physical therapy and pain management.

## 2021-07-12 NOTE — DISCHARGE NOTE PROVIDER - CARE PROVIDER_API CALL
"Physical Therapy Progress NoteÂ      Visit Count: 9   Authorization Status: $0 used for PT/ST, recheck at 10 visits  Next Referring Provider Visit: None set up    Initial Evaluation Date: 2-24-17  Referred by: Dr. Vale Arshad MD  Medical Diagnosis (from order):  S/P ELEAZAR right hip; impaired strength  Primary Insurance: MEDICARE  Secondary Insurance: ANTHEM/allGreenup    Date of Surgery: 9-27-17; surgery performed: Post R ELEAZAR; rehabilitation guidelines: no  Diagnosis Precautions: none  Relevant co-morbidities and medications: None   Relevant Tests: Relevant diagnostic tests: plain film radiograph     SUBJECTIVE   Pt reports doing well today, has been using heat after exercise which helps to decrease soreness. No pain currently.       OBJECTIVE     Treatment   Therapeutic Exercise:   Exercise Repetitions Sets Position/Cues   NuStep S7, A9, L6 8 minutes     Standing heel raise 20     Sit to/from stand 12     Standing hip abduction  12 each  2 hands   Standing hip extension  12 each  2 hands   Marching 12 each  2 hands   Step ups forward 4"" 10 right   2 hands   Step ups lateral 4\"" 10 right  2 hands   Hooklying Hip ADD and ABD 10 each 2 With green theraband    Sidestepping in // bars 4 each direction  2 hands         Current Home Program (not performed this date except as noted above):       Home Exercise Program:  Exercise: Date issued Date DC Comments   GENTLE stretch into hip flexion 2/24/17 Â  Â    Bridging 2/24/17 Â 3/13/17 Due to increased back pain and spasms   Supine hip abduction  2/24/17 Â  Â    Â Standing heel raises 3/13/17 Â  Â    Â Standing hip extension  3/13/17 Â  Â     Standing hip abduction  3/13/17  With slight hip flexion during    Standing marching 3/13/17      Minisquats 3/13/17  Modified to sit to/from stand 3/20/17    Standing hamstring curls 3/13/17     Sidestepping by countertop 3/13/17     Hooklying hip abduction 3/20/17  With green theraband    Â          ASSESSMENT   Pt with cont weakness, but improving " overall. Pt more aware of movement deficits and works hard to correct at home. Fatigued after ex, but no pain. Pain after treatment: 0/10 at rest.  Patient verbalized and demonstrated understanding of education as documented/outlined above, but would benefit from further reinforcement. Goals: To be obtained by end of this plan of care:  1. Patient independent with modified and progressed home exercise program. Progressing  2. Patient will decrease involved hip pain to 4/10 to aid in age appropriate activities. Progressing  3. Patient will increase involved hip active range of motion to near [facility] to aid in completing transfers including low and soft surfaces. Progressing  4. Patient will increase involved hip strength to 4+/5 to aid in reciprocal stair ambulation and completing transfers including low and soft surfaces. Progressing  5. Patient will ambulate community distances on even and uneven terrain with normal gait pattern without assistive device and without loss of balance. Progressing  6. Patient will be able to ascend and descend 1 flight of stairs reciprocal with minimal pain/difficulty. Progressing  7. Improve single leg standing balance to 3 seconds to improve ability to ambulate on level and unlevel surfaces to improve function in community interaction and reduce risk for falls. Progressing    PLAN     Continue with strengthening and manual therapy for right hip.      THERAPY DAILY BILLING   Primary Insurance: MEDICARE  Secondary Insurance: ANTHEM/BCBS    Evaluation Procedures:  No evaluation codes were used on this date of service    Timed Procedures:  Therapeutic Exercise, 40 minutes    Untimed Procedures:  No untimed codes were used on this date of service    Total Treatment Time: 40 minutes    G-Code:  G-Code Score ABN form  : Current Mobility Limitation,  CK - 40% to 59% impaired, limited or restricted  : Goal Mobility Limitation,  CJ - 20% to 39% impaired, limited or restricted  Modifier based on outcome measure(s)/functional testing/clinical judgement as listed above      Routine safety standards followed per [facility] system and site policies Donovan Mayers)  Orthopaedic Surgery  [address], [address]  [place] [address]  Phone: [phone]  Fax: [phone]  Follow Up Time: 2 weeks

## 2021-07-12 NOTE — PHYSICAL THERAPY INITIAL EVALUATION ADULT - GENERAL OBSERVATIONS, REHAB EVAL
alert, oriented; NAD; + SCDs; + left peripheral IV access; hip precaution adductor alert, oriented; NAD; + SCDs; + left peripheral IV access; hip precaution adductor; +abduction pillow in place

## 2021-07-12 NOTE — DISCHARGE NOTE PROVIDER - NSDCMRMEDTOKEN_GEN_ALL_CORE_FT
CeleBREX 200 mg oral capsule: 1 cap(s) orally once a day, As Needed  Feosol 200 mg (65 mg elemental iron) oral tablet: 1 tab(s) orally 2 times a day   gabapentin 100 mg oral capsule: 1 cap(s) orally 3 times a day   Keflex 500 mg oral capsule: 1 cap(s) orally every 8 hours    TAKE once at 7/12/21 @8pm   Take once at 7/13/21 @4am   metFORMIN 500 mg oral tablet: 1 tab(s) orally 2 times a day  metoprolol succinate 100 mg oral tablet, extended release: 1 tab(s) orally once a day  MiraLax oral powder for reconstitution: 17 gram(s) orally once a day (at bedtime), As Needed -for constipation   montelukast 10 mg oral tablet: 1 tab(s) orally once a day  ondansetron 8 mg oral tablet: 1 tab(s) orally every 8 hours, As Needed -for nausea   ondansetron 8 mg oral tablet: 1 tab(s) orally every 8 hours, As Needed -for nausea   oxycodone-acetaminophen 5 mg-325 mg oral tablet: 1 tab(s) orally every 4 hours, As Needed -for severe pain MDD:6 Supervising MD: Dr. Christiano Fitch. Lic#[id number] NPI [id number]   pantoprazole 20 mg oral delayed release tablet: 1 tab(s) orally 2 times a day  phentermine 37.5 mg oral tablet: 1 tab(s) orally once a day  senna 8.6 mg oral tablet: 1 tab(s) orally once a day (at bedtime), As Needed -for constipation   Symbicort 160 mcg-4.5 mcg/inh inhalation aerosol: 2 puff(s) inhaled 2 times a day  Xarelto 10 mg oral tablet: 1 tab(s) orally once a day

## 2021-07-12 NOTE — ASU PATIENT PROFILE, ADULT - PMH
Asthma  diagnosed 2003, never needed use Albuterol, never hospitalized or intubated  Chronic GERD    DVT, lower extremity  right leg - was on  Xarelto - 2016 arthroscopy of knee  Dyspnea on exertion    Graves' disease  TSH checked today, pt is not taking any thyroid meds  Hypertension    Lower back pain    Obesity  BMI-38  PAOLA (obstructive sleep apnea)  dx 01/2021, had second sleep studies 05/2021 for CPAP setting just recently,awaiting for CPAP , as per pt this is moderate PAOLA, will obtain sleep studies before sx,contact [facility] [place] [phone] to notify OR booking  Prediabetes  ???  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

## 2021-07-16 ENCOUNTER — EMERGENCY (EMERGENCY)
Facility: HOSPITAL | Age: 46
LOS: 1 days | End: 2021-07-16
Payer: SELF-PAY

## 2021-07-16 VITALS
DIASTOLIC BLOOD PRESSURE: 63 MMHG | OXYGEN SATURATION: 99 % | HEART RATE: 108 BPM | RESPIRATION RATE: 17 BRPM | WEIGHT: 255.07 LBS | TEMPERATURE: 100 F | SYSTOLIC BLOOD PRESSURE: 101 MMHG | HEIGHT: 69 IN

## 2021-07-16 PROCEDURE — 99281 EMR DPT VST MAYX REQ PHY/QHP: CPT

## 2021-07-16 PROCEDURE — L9991: CPT

## 2021-07-16 NOTE — ED ADULT TRIAGE NOTE - CHIEF COMPLAINT QUOTE
had recent left hip replacement 7/12, having left leg pain and swelling since 7/14, was seen at [facility] last night with elevated d dimer and CTA inconclusive   spoke with MD and told to [facility] to r/o PE

## 2021-07-16 NOTE — ED ADULT NURSE NOTE - CHIEF COMPLAINT QUOTE
had recent left hip replacement 7/12, having left leg pain and swelling since 7/14, was seen at [facility] last night with elevated d dimer and CTA inconclusive   spoke with MD and told to [facility] to r/o PE

## 2021-07-17 ENCOUNTER — EMERGENCY (EMERGENCY)
Facility: HOSPITAL | Age: 46
LOS: 1 days | Discharge: HOME | End: 2021-07-17
Attending: EMERGENCY MEDICINE | Admitting: EMERGENCY MEDICINE
Payer: COMMERCIAL

## 2021-07-17 VITALS
SYSTOLIC BLOOD PRESSURE: 110 MMHG | WEIGHT: 255.07 LBS | TEMPERATURE: 98 F | RESPIRATION RATE: 18 BRPM | HEART RATE: 123 BPM | OXYGEN SATURATION: 99 % | DIASTOLIC BLOOD PRESSURE: 77 MMHG | HEIGHT: 69 IN

## 2021-07-17 VITALS
HEART RATE: 87 BPM | SYSTOLIC BLOOD PRESSURE: 109 MMHG | OXYGEN SATURATION: 97 % | TEMPERATURE: 98 F | DIASTOLIC BLOOD PRESSURE: 69 MMHG | RESPIRATION RATE: 16 BRPM

## 2021-07-17 DIAGNOSIS — E32.0 PERSISTENT HYPERPLASIA OF THYMUS: Chronic | ICD-10-CM

## 2021-07-17 DIAGNOSIS — Z96.641 PRESENCE OF RIGHT ARTIFICIAL HIP JOINT: Chronic | ICD-10-CM

## 2021-07-17 DIAGNOSIS — Z98.890 OTHER SPECIFIED POSTPROCEDURAL STATES: Chronic | ICD-10-CM

## 2021-07-17 LAB
ALBUMIN SERPL ELPH-MCNC: 2.8 G/DL — LOW (ref 3.3–5)
ALP SERPL-CCNC: 49 U/L — SIGNIFICANT CHANGE UP (ref 40–120)
ALT FLD-CCNC: 37 U/L — SIGNIFICANT CHANGE UP (ref 12–78)
ANION GAP SERPL CALC-SCNC: 7 MMOL/L — SIGNIFICANT CHANGE UP (ref 5–17)
APTT BLD: 32.1 SEC — SIGNIFICANT CHANGE UP (ref 27.5–35.5)
AST SERPL-CCNC: 25 U/L — SIGNIFICANT CHANGE UP (ref 15–37)
BASOPHILS # BLD AUTO: 0.02 K/UL — SIGNIFICANT CHANGE UP (ref 0–0.2)
BASOPHILS NFR BLD AUTO: 0.2 % — SIGNIFICANT CHANGE UP (ref 0–2)
BILIRUB SERPL-MCNC: 1.2 MG/DL — SIGNIFICANT CHANGE UP (ref 0.2–1.2)
BUN SERPL-MCNC: 20 MG/DL — SIGNIFICANT CHANGE UP (ref 7–23)
CALCIUM SERPL-MCNC: 9.2 MG/DL — SIGNIFICANT CHANGE UP (ref 8.5–10.1)
CHLORIDE SERPL-SCNC: 103 MMOL/L — SIGNIFICANT CHANGE UP (ref 96–108)
CO2 SERPL-SCNC: 29 MMOL/L — SIGNIFICANT CHANGE UP (ref 22–31)
CREAT SERPL-MCNC: 1.1 MG/DL — SIGNIFICANT CHANGE UP (ref 0.5–1.3)
D DIMER BLD IA.RAPID-MCNC: 1384 NG/ML DDU — HIGH
EOSINOPHIL # BLD AUTO: 0.33 K/UL — SIGNIFICANT CHANGE UP (ref 0–0.5)
EOSINOPHIL NFR BLD AUTO: 3.2 % — SIGNIFICANT CHANGE UP (ref 0–6)
GLUCOSE SERPL-MCNC: 130 MG/DL — HIGH (ref 70–99)
HCT VFR BLD CALC: 32 % — LOW (ref 39–50)
HGB BLD-MCNC: 10.9 G/DL — LOW (ref 13–17)
IMM GRANULOCYTES NFR BLD AUTO: 0.6 % — SIGNIFICANT CHANGE UP (ref 0–1.5)
INR BLD: 1.44 RATIO — HIGH (ref 0.88–1.16)
LYMPHOCYTES # BLD AUTO: 1.45 K/UL — SIGNIFICANT CHANGE UP (ref 1–3.3)
LYMPHOCYTES # BLD AUTO: 14.2 % — SIGNIFICANT CHANGE UP (ref 13–44)
MCHC RBC-ENTMCNC: 32.1 PG — SIGNIFICANT CHANGE UP (ref 27–34)
MCHC RBC-ENTMCNC: 34.1 GM/DL — SIGNIFICANT CHANGE UP (ref 32–36)
MCV RBC AUTO: 94.1 FL — SIGNIFICANT CHANGE UP (ref 80–100)
MONOCYTES # BLD AUTO: 0.83 K/UL — SIGNIFICANT CHANGE UP (ref 0–0.9)
MONOCYTES NFR BLD AUTO: 8.1 % — SIGNIFICANT CHANGE UP (ref 2–14)
NEUTROPHILS # BLD AUTO: 7.52 K/UL — HIGH (ref 1.8–7.4)
NEUTROPHILS NFR BLD AUTO: 73.7 % — SIGNIFICANT CHANGE UP (ref 43–77)
NRBC # BLD: 0 /100 WBCS — SIGNIFICANT CHANGE UP (ref 0–0)
NT-PROBNP SERPL-SCNC: 363 PG/ML — HIGH (ref 0–125)
PLATELET # BLD AUTO: 322 K/UL — SIGNIFICANT CHANGE UP (ref 150–400)
POTASSIUM SERPL-MCNC: 3.5 MMOL/L — SIGNIFICANT CHANGE UP (ref 3.5–5.3)
POTASSIUM SERPL-SCNC: 3.5 MMOL/L — SIGNIFICANT CHANGE UP (ref 3.5–5.3)
PROT SERPL-MCNC: 7.1 G/DL — SIGNIFICANT CHANGE UP (ref 6–8.3)
PROTHROM AB SERPL-ACNC: 16.6 SEC — HIGH (ref 10.6–13.6)
RBC # BLD: 3.4 M/UL — LOW (ref 4.2–5.8)
RBC # FLD: 11.9 % — SIGNIFICANT CHANGE UP (ref 10.3–14.5)
SARS-COV-2 RNA SPEC QL NAA+PROBE: SIGNIFICANT CHANGE UP
SODIUM SERPL-SCNC: 139 MMOL/L — SIGNIFICANT CHANGE UP (ref 135–145)
TROPONIN I SERPL-MCNC: <.015 NG/ML — SIGNIFICANT CHANGE UP (ref 0.01–0.04)
WBC # BLD: 10.21 K/UL — SIGNIFICANT CHANGE UP (ref 3.8–10.5)
WBC # FLD AUTO: 10.21 K/UL — SIGNIFICANT CHANGE UP (ref 3.8–10.5)

## 2021-07-17 PROCEDURE — 85730 THROMBOPLASTIN TIME PARTIAL: CPT

## 2021-07-17 PROCEDURE — 99284 EMERGENCY DEPT VISIT MOD MDM: CPT | Mod: 25

## 2021-07-17 PROCEDURE — 93970 EXTREMITY STUDY: CPT

## 2021-07-17 PROCEDURE — 93970 EXTREMITY STUDY: CPT | Mod: 26

## 2021-07-17 PROCEDURE — 93005 ELECTROCARDIOGRAM TRACING: CPT

## 2021-07-17 PROCEDURE — 99285 EMERGENCY DEPT VISIT HI MDM: CPT

## 2021-07-17 PROCEDURE — 71275 CT ANGIOGRAPHY CHEST: CPT | Mod: 26,MA

## 2021-07-17 PROCEDURE — 36415 COLL VENOUS BLD VENIPUNCTURE: CPT

## 2021-07-17 PROCEDURE — 71275 CT ANGIOGRAPHY CHEST: CPT | Mod: MA

## 2021-07-17 PROCEDURE — 80053 COMPREHEN METABOLIC PANEL: CPT

## 2021-07-17 PROCEDURE — U0005: CPT

## 2021-07-17 PROCEDURE — 96361 HYDRATE IV INFUSION ADD-ON: CPT

## 2021-07-17 PROCEDURE — 85610 PROTHROMBIN TIME: CPT

## 2021-07-17 PROCEDURE — 85379 FIBRIN DEGRADATION QUANT: CPT

## 2021-07-17 PROCEDURE — 85025 COMPLETE CBC W/AUTO DIFF WBC: CPT

## 2021-07-17 PROCEDURE — 83880 ASSAY OF NATRIURETIC PEPTIDE: CPT

## 2021-07-17 PROCEDURE — 93010 ELECTROCARDIOGRAM REPORT: CPT

## 2021-07-17 PROCEDURE — U0003: CPT

## 2021-07-17 PROCEDURE — 96374 THER/PROPH/DIAG INJ IV PUSH: CPT | Mod: XU

## 2021-07-17 PROCEDURE — 84484 ASSAY OF TROPONIN QUANT: CPT

## 2021-07-17 RX ORDER — SODIUM CHLORIDE 9 MG/ML
1000 INJECTION INTRAMUSCULAR; INTRAVENOUS; SUBCUTANEOUS ONCE
Refills: 0 | Status: COMPLETED | OUTPATIENT
Start: 2021-07-17 | End: 2021-07-17

## 2021-07-17 RX ORDER — HYDROMORPHONE HYDROCHLORIDE 2 MG/ML
0.5 INJECTION INTRAMUSCULAR; INTRAVENOUS; SUBCUTANEOUS ONCE
Refills: 0 | Status: DISCONTINUED | OUTPATIENT
Start: 2021-07-17 | End: 2021-07-17

## 2021-07-17 RX ADMIN — SODIUM CHLORIDE 1000 MILLILITER(S): 9 INJECTION INTRAMUSCULAR; INTRAVENOUS; SUBCUTANEOUS at 16:30

## 2021-07-17 RX ADMIN — HYDROMORPHONE HYDROCHLORIDE 0.5 MILLIGRAM(S): 2 INJECTION INTRAMUSCULAR; INTRAVENOUS; SUBCUTANEOUS at 17:57

## 2021-07-17 RX ADMIN — SODIUM CHLORIDE 1000 MILLILITER(S): 9 INJECTION INTRAMUSCULAR; INTRAVENOUS; SUBCUTANEOUS at 15:30

## 2021-07-17 RX ADMIN — HYDROMORPHONE HYDROCHLORIDE 0.5 MILLIGRAM(S): 2 INJECTION INTRAMUSCULAR; INTRAVENOUS; SUBCUTANEOUS at 18:12

## 2021-07-17 NOTE — ED ADULT NURSE NOTE - NSIMPLEMENTINTERV_GEN_ALL_ED
Implemented All Universal Safety Interventions:  [place] to call system. Call bell, personal items and telephone within reach. Instruct patient to call for assistance. Room bathroom lighting operational. Non-slip footwear when patient is off stretcher. Physically safe environment: no spills, clutter or unnecessary equipment. Stretcher in lowest position, wheels locked, appropriate side rails in place.

## 2021-07-17 NOTE — ED PROVIDER NOTE - PROVIDER TOKENS
FREE:[LAST:[YOUR PMD],PHONE:[(   )    -],FAX:[(   )    -],FOLLOWUP:[1-3 Days]],FREE:[LAST:[YOUR ORTHOPEDIST],PHONE:[(   )    -],FAX:[(   )    -],FOLLOWUP:[1-3 Days]]

## 2021-07-17 NOTE — ED PROVIDER NOTE - CLINICAL SUMMARY MEDICAL DECISION MAKING FREE TEXT BOX
pt sp hip surgery with leg swelling and cp, hx dvt.  pt with outpt ct undeterminate,  check cta, us, trops,  if neg d/c to fu pmd

## 2021-07-17 NOTE — ED PROVIDER NOTE - PMH
Asthma  diagnosed 2003, never needed use Albuterol, never hospitalized or intubated  Chronic GERD    DVT, lower extremity  right leg - was on  Xarelto - 2016 arthroscopy of knee  Dyspnea on exertion    Graves' disease  TSH checked today, pt is not taking any thyroid meds  Hypertension    Lower back pain    Obesity  BMI-38  PAOLA (obstructive sleep apnea)  dx 01/2021, had second sleep studies 05/2021 for CPAP setting just recently,awaiting for CPAP , as per pt this is moderate PAOLA, will obtain sleep studies before sx,contact [facility] [place] [phone] to notify OR booking  Prediabetes  ???  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

## 2021-07-17 NOTE — ED ADULT NURSE NOTE - PMH
Asthma  diagnosed 2003, never needed use Albuterol, never hospitalized or intubated  Chronic GERD    DVT, lower extremity  right leg - was on  Xarelto - 2016 arthroscopy of knee  Dyspnea on exertion    Graves' disease  TSH checked today, pt is not taking any thyroid meds  Hypertension    Lower back pain    Obesity  BMI-38  PAOLA (obstructive sleep apnea)  dx 01/2021, had second sleep studies 05/2021 for CPAP setting just recently,awaiting for CPAP , as per pt this is moderate PAOLA, will obtain sleep studies before sx,contact [facility] [place] [phone] to notify OR booking  Prediabetes  ???  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

## 2021-07-17 NOTE — ED PROVIDER NOTE - NSFOLLOWUPINSTRUCTIONS_ED_ALL_ED_FT
Follow up with your PMD and Orthopedist in 1-2 days for re-evaluation, ongoing care and treatment. Rest, elevate leg. If having worsening of symptoms or other related symptoms, RETURN TO THE ER IMMEDIATELY.

## 2021-07-17 NOTE — ED PROVIDER NOTE - EKG #1 DATE/TIME
---___---___---___---___---___---___ ---___---___---___---___---___---___---___---___---                  M E D I C A L   A T T E N D I N G   P R O G R E S S   N O T E  ---___---___---___---___---___---___ ---___---___---___---___---___---___---___---___---        ================================================    ++CHIEF COMPLAINT:   Patient is a 67y old  Female who presents with a chief complaint of PEG tube dislodgement, fever, leukocytosis (05 Aug 2019 19:59)      trang  family waiting for hospice meeting    ---___---___---___---___---___---  PAST MEDICAL / Surgical  HISTORY:  PAST MEDICAL & SURGICAL HISTORY:  Type 2 diabetes mellitus  Dementia  DVT, lower extremity  CVA (cerebral vascular accident)  Fatty pancreas  PNA (pneumonia)  Pulmonary HTN  IGT (impaired glucose tolerance)  Ulcerative colitis  Acid reflux  Anxiety  Depression  Mouth sores  HLD (hyperlipidemia)  Asthma  S/P percutaneous endoscopic gastrostomy (PEG) tube placement: for dysphagia  Humeral head fracture  H/O: hysterectomy  H/O cataract extraction, left  History of knee replacement      ---___---___---___---___---___---  FAMILY HISTORY:   FAMILY HISTORY:  Family history of dementia (Grandparent)  Family history of colon cancer (Grandparent)        ---___---___---___---___---___---  ALLERGIES:   Allergies    ASA; dye contrast (Anaphylaxis)  aspirin (Short breath)  divalproex sodium (Other (Unknown))  Flowers and Plants (Short breath)  Haldol (Other (Unknown))  penicillin (Short breath; Rash)  sulfa drugs (Short breath; Rash)  vancomycin (Rash; Urticaria; Hives)  Xanax (Other (Unknown))    Intolerances        ---___---___---___---___---___---  MEDICATIONS:  MEDICATIONS  (STANDING):  ALBUTerol/ipratropium for Nebulization 3 milliLiter(s) Nebulizer every 6 hours  apixaban 2.5 milliGRAM(s) Oral two times a day  artificial tears (preservative free) Ophthalmic Solution 1 Drop(s) Both EYES four times a day  BACItracin   Ointment 1 Application(s) Topical two times a day  chlorhexidine 2% Cloths 1 Application(s) Topical daily  clonazePAM  Tablet 1 milliGRAM(s) Oral at bedtime  Dakins Solution - 1/4 Strength 1 Application(s) Topical two times a day  dextrose 5%. 1000 milliLiter(s) (50 mL/Hr) IV Continuous <Continuous>  famotidine    Tablet 20 milliGRAM(s) Oral every 48 hours  ferrous    sulfate Liquid 300 milliGRAM(s) Enteral Tube daily  formoterol for nebulization 20 MICROGram(s) Nebulizer two times a day  gabapentin   Solution 300 milliGRAM(s) Oral daily  hydrocortisone 1% Cream 1 Application(s) Topical once  HYDROmorphone  Injectable 0.2 milliGRAM(s) IV Push <User Schedule>  insulin lispro (HumaLOG) corrective regimen sliding scale   SubCutaneous every 6 hours  levothyroxine Injectable 60 MICROGram(s) IV Push at bedtime  Medical Marijuana Awake Oil 2 milliLiter(s),Medical Marijuana Awake Oil 2 milliLiter(s) 2 milliLiter(s) Enteral Tube <User Schedule>  Medical Marijuana Calm Oil 2 milliLiter(s),Medical Marinjuana Calm Oil 2 milliLiter(s) 2 milliLiter(s) Enteral Tube <User Schedule>  Medical Marijuana Merom Oil 2 milliLiter(s),Medical Marijuana Merom Oil 2 milliLiter(s) 2 milliLiter(s) Enteral Tube <User Schedule>  metoprolol tartrate 12.5 milliGRAM(s) Oral two times a day  multivitamin/minerals/iron Oral Solution (CENTRUM) 15 milliLiter(s) Oral daily  naloxone Injectable 0.1 milliGRAM(s) IV Push every 3 minutes  QUEtiapine 25 milliGRAM(s) Oral at bedtime  sevelamer carbonate Powder 800 milliGRAM(s) Oral three times a day with meals  sodium chloride 0.9%. 1000 milliLiter(s) (50 mL/Hr) IV Continuous <Continuous>  tiZANidine 4 milliGRAM(s) Oral <User Schedule>  zinc sulfate 220 milliGRAM(s) Oral daily    MEDICATIONS  (PRN):  acetaminophen  Suppository .. 650 milliGRAM(s) Rectal every 6 hours PRN Temp greater or equal to 38C (100.4F), Mild Pain (1 - 3)  carboxymethylcellulose 0.5% (Preservative-Free) Ophthalmic Solution 1 Drop(s) Both EYES three times a day PRN dry eyes  glucagon  Injectable 1 milliGRAM(s) IntraMuscular once PRN Glucose LESS THAN 70 milligrams/deciliter  HYDROmorphone  Injectable 0.3 milliGRAM(s) IV Push every 4 hours PRN Severe Pain (7 - 10)  nystatin Powder 1 Application(s) Topical three times a day PRN under breasts      ---___---___---___---___---___---  REVIEW OF SYSTEM:  unable to obtain    ---___---___---___---___---___---  VITAL SIGNS:  67y , CAPILLARY BLOOD GLUCOSE      POCT Blood Glucose.: 144 mg/dL (06 Aug 2019 06:24)    T(C): 36.3 (19 @ 09:00), Max: 36.9 (19 @ 21:45)  HR: 103 (19 @ 09:00) (100 - 105)  BP: 113/69 (19 @ 09:00) (109/69 - 133/65)  RR: 18 (19 @ 09:00) (18 - 18)  SpO2: 95% (19 @ 09:00) (95% - 100%)  ---___---___---___---___---___---  PHYSICAL EXAM:    GEN: A&O X 0 , NAD , comfortable  HEENT: NCAT, PERRL, MMM, hearing intact  Neck: supple , no JVD  CVS: S1S2 , regular , No M/R/G appreciated  PULM: CTA B/L,  no W/R/R appreciated  ABD.: soft. non tender, non distended,  bowel sounds present peg noted   Extrem: intact pulses , no edema  contractures to legs   Derm: No rash , no ecchymoses sacral decubitus and multiple decubitus ulcers noted  PSYCH : normal mood,  no delusion  anxious     ---___---___---___---___---___---            LAB AND IMAGIN.5    13.37 )-----------( 212      ( 05 Aug 2019 07:54 )             23.5               08-05    132<L>  |  87<L>  |  160<H>  ----------------------------<  105<H>  4.4   |  20<L>  |  3.88<H>    Ca    7.9<L>      05 Aug 2019 06:30                                  [All pertinent / recent Imaging reviewed]         ---___---___---___---___---___---___ ---___---___---___---___---                         A S S E S S M E N T   A N D   P L A N :      HEALTH ISSUES - PROBLEM Dx:  Palliative care encounter: Palliative care encounter awaiting hopsice meeting with family  Advanced dementia: Advanced dementia suportive care  Functional quadriplegia: Functional quadriplegia   supportive careAgitation: Agitation  Pain: Pain continue meds aper hospice  TRANG (acute kidney injury): TRANG (acute kidney injury) follow cmp  Fever: Fever resolved  Type 2 diabetes mellitus without complication, without long-term current use of insulin:  continue current insulin regimen        Pressure injury of skin of sacral region, unspecified injury stage: Continue with wound care    Maintain adequate nutrition, frequent repositioning , offloading with Zfloat boots.     Chronic deep vein thrombosis (DVT) of right lower extremity, unspecified vein:  on eliquis        PEG tube malfunction: PEG tube malfunction reslved                -GI/DVT Prophylaxis.    --------------------------------------------  Case discussed with   Education given on   ___________________________  Thank you,  Deniz Bolden  [phone] 17-Jul-2021 14:41

## 2021-07-17 NOTE — ED PROVIDER NOTE - PROGRESS NOTE DETAILS
Reevaluated patient at bedside.  Patient feeling much improved.  Discussed the results of all diagnostic testing in ED and copies of all reports given. Advised follow up with ortho and PCP.  An opportunity to ask questions was given.  Discussed the importance of prompt, close medical follow-up.  Patient will return with any changes, concerns or persistent / worsening symptoms.  Understanding of all instructions verbalized.

## 2021-07-17 NOTE — ED PROVIDER NOTE - CARE PROVIDER_API CALL
YOUR PMD,   Phone: (   )    -  Fax: (   )    -  Follow Up Time: 1-3 Days    YOUR ORTHOPEDIST,   Phone: (   )    -  Fax: (   )    -  Follow Up Time: 1-3 Days

## 2021-07-17 NOTE — ED PROVIDER NOTE - CONSTITUTIONAL, MLM
normal... Well appearing, awake, alert, oriented to person, place, time/situation and slightly anxious

## 2021-07-17 NOTE — ED PROVIDER NOTE - OBJECTIVE STATEMENT
44 y/o M with hx of GERD, graves disease, asthma, HTN, sleep apnea, hx of RLE DVT presents with c/o LLE swelling, chest pain and shortness of breath x 4 days. Pt states that he is s/p L hip replacement by Dr. Bernardino Mayers at [facility] on 7/12/21, started having LLE swelling/chest pain/sob on 7/14. States that he was seen at [facility] on 7/15 for same and had negative dopplers, elevated ddimer and cta chest with bolus insufficient, had repeat cta chest after a few hours at the same facility and had similar results with limited study due to insufficient bolus and discharged. States that his symptoms persists, spoke with orthopedist who sent him to ED for eval. Pt has oxycodone at home for pain, took a dose few hours PTA.

## 2021-07-17 NOTE — ED PROVIDER NOTE - ATTENDING CONTRIBUTION TO CARE
Pt seen and examined and d/w PA.  agree with a and p.  pt is a 46 yo male s/p left hip replacement on 7/12 and now with  left leg swelling, and vague cp. pt seen at [facility] several days ago and had cta with bolus insufficient and d/c and had repeat ct next day, same.  pt states sx persist, no sob and told by surgeon to come to er for repeat study.  pt on exam in nad, lung  cta, mild left leg swelling , incision c/d/i,  dd elevated,  us neg,  cta adequate and negative, d/c fu pmd

## 2021-07-17 NOTE — ED ADULT NURSE NOTE - OBJECTIVE STATEMENT
Presents to ER with SOB/tachycardia. S/P hip replacement.  Hx of DVT. Presents to ER with SOB/tachycardia/CP. S/P left hip replacement.  Hx of DVT.  STAT EKG obtained, placed on continuos cardiac monitoring. Presents to ER with SOB/tachycardia/CP. S/P left hip replacement.  Hx of DVT.  STAT EKG obtained, placed on continuos cardiac monitoring.  Left leg swollen, mild pitting edema +1, + pedal pulse, good cap refill.

## 2021-07-17 NOTE — ED PROVIDER NOTE - PATIENT PORTAL LINK FT
You can access the FollowMyHealth Patient Portal offered by [facility] by registering at the following website: [email or web address]. By joining [facility]’s FollowMyHealth portal, you will also be able to view your health information using other applications (apps) compatible with our system.

## 2021-07-17 NOTE — ED PROVIDER NOTE - MUSCULOSKELETAL, MLM
+LLE swelling noted, 1+ pitting edema noted LLE, pedal pulses intact, toes warm & nl cap refill, sensation intact, L hip wound C/D/I, no erythema or discharge noted, calf NT, NVI

## 2021-07-19 LAB — SURGICAL PATHOLOGY STUDY: SIGNIFICANT CHANGE UP

## 2021-12-20 NOTE — H&P PST ADULT - CORNEAL ABRASION RISK
FAMILY HISTORY:  Father  Still living? Unknown  Family history of diabetes mellitus, Age at diagnosis: Age Unknown  Family history of diabetes mellitus (DM), Age at diagnosis: Age Unknown    Mother  Still living? Yes, Estimated age: Age Unknown  Family history of diabetes mellitus, Age at diagnosis: Age Unknown  Family history of diabetes mellitus (DM), Age at diagnosis: Age Unknown    Grandparent  Still living? Unknown  Family history of diabetes mellitus (DM), Age at diagnosis: Age Unknown     denies

## 2022-06-10 NOTE — H&P PST ADULT - EXTREMITIES
----- Message from Bety Mahmood sent at 6/8/2022  3:47 PM CDT -----  Regarding: Refill on birth control   Tere Wasserman! I had one refill left on my birth control however something did not go through and I have to contact you to refill it to [facility] for me. Are you able to do that as soon as possible? Thanks in advance!    detailed exam

## 2022-07-08 ENCOUNTER — TRANSCRIPTION ENCOUNTER (OUTPATIENT)
Age: 47
End: 2022-07-08

## 2022-07-19 NOTE — PHYSICAL THERAPY INITIAL EVALUATION ADULT - SKIN INTEGRITY
Received fax from [facility] requesting cardiac clearance for cysto and pvp ([facility])  Dr. Kunal Noriega MD  07/27/2022  [facility] [place]  General Anesthesia     Hold [facility] x 7 days prior.     Pt recently seen by Dr. Pati Velasco 06/28/22.   Will see if Dr. Pati Velasco can clear him from that visit.   Office also requesting EKG - last EKG last year.      on posterolateral aspect of left hip covered with wound dressing clean, dry, intact/surgical incision/wound

## 2022-11-13 NOTE — ASU PATIENT PROFILE, ADULT - URINARY CATHETER
[id number]     Carlie Mccabe MRN: [id number]  AGE: 85 year old  ADMIT DATE: 11/12/2022    CODE STATUS: Full Resuscitation  ISOLATION STATUS: Contact and Droplet   DIET: Consistent Carb Moderate (45-75 Gm/meal) Diet    ALLERGIES:  Celecoxib, Codeine, and Streptomycin     DX:Pneumonia due to infectious organism, unspecified laterality, unspecified part of lung  (primary encounter diagnosis)     Att: Geoff Covington MD  PCP: Melissa Viera MD  IP Consult Orders (From admission, onward)             Start     Ordered    11/12/22 1400  Inpatient consult to Pulmonology  ONE TIME        Provider:  El Rosales MD    11/12/22 1359                    BP: 114/55  Temp: 97.2 °F (36.2 °C)  Temp src: Axillary  Heart Rate: 71  Resp: 20  SpO2: 94 %  Height: 4' 9\" (144.8 cm)  Weight: 61.7 kg (136 lb)   Weight change:      Recent Labs   Lab 11/12/22 2034 11/13/22  0748 11/13/22  1142 11/13/22  1618   GLUCOSE BEDSIDE 162* 104* 176* 154*        Creatinine (mg/dL)   Date Value   11/13/2022 0.61   11/12/2022 0.68     PTT   Date Value   07/21/2020 29 second(s)   07/21/2020 29 seconds     INR (no units)   Date Value   07/21/2020 1.0   07/21/2020 1.0     WBC (K/mcL)   Date Value   11/13/2022 4.6   11/12/2022 5.4        I/O last 3 completed shifts:  In: 350 [P.O.:350]  Out: -                          IMPORTANT EVENTS THIS SHIFT:  -A&0x3, SBA, fall and isolation precaution, call light within reach.   -Pulmonary saw pt and  started on IV steroid   - c/o having hard time sleep at night new order of 3 mg of melatonin available to help pt sleep  IMPORTANT EVENTS COMING UP/GOALS (PLEASE INCLUDE WHITE BOARD AND DISCHARGE BOARD UPDATES):       PATIENT SPECIAL NEEDS/ACCOMMODATIONS:                                no

## 2023-03-13 ENCOUNTER — OFFICE (OUTPATIENT)
Dept: URBAN - METROPOLITAN AREA CLINIC 35 | Facility: CLINIC | Age: 48
Setting detail: OPHTHALMOLOGY
End: 2023-03-13
Payer: COMMERCIAL

## 2023-03-13 DIAGNOSIS — H52.13: ICD-10-CM

## 2023-03-13 DIAGNOSIS — H52.4: ICD-10-CM

## 2023-03-13 DIAGNOSIS — H01.001: ICD-10-CM

## 2023-03-13 DIAGNOSIS — H01.004: ICD-10-CM

## 2023-03-13 DIAGNOSIS — H25.013: ICD-10-CM

## 2023-03-13 DIAGNOSIS — H43.393: ICD-10-CM

## 2023-03-13 DIAGNOSIS — H52.7: ICD-10-CM

## 2023-03-13 PROCEDURE — 99214 OFFICE O/P EST MOD 30 MIN: CPT | Performed by: OPHTHALMOLOGY

## 2023-03-13 PROCEDURE — 92015 DETERMINE REFRACTIVE STATE: CPT | Performed by: OPHTHALMOLOGY

## 2023-03-13 ASSESSMENT — REFRACTION_CURRENTRX
OD_CYLINDER: -0.50
OS_CYLINDER: -1.25
OD_VPRISM_DIRECTION: SV
OD_CYLINDER: -0.75
OS_OVR_VA: 20/
OD_OVR_VA: 20/
OS_CYLINDER: -1.25
OS_SPHERE: PLANO
OD_VPRISM_DIRECTION: SV
OD_SPHERE: -0.25
OS_AXIS: 030
OS_AXIS: 026
OS_SPHERE: PLANO
OD_SPHERE: PLANO
OD_OVR_VA: 20/
OS_VPRISM_DIRECTION: SV
OS_OVR_VA: 20/
OD_AXIS: 150
OD_AXIS: 155
OS_VPRISM_DIRECTION: SV

## 2023-03-13 ASSESSMENT — REFRACTION_MANIFEST
OS_SPHERE: -0.25
OS_AXIS: 020
OD_CYLINDER: -0.75
OD_AXIS: 140
OD_AXIS: 150
OD_CYLINDER: -0.50
OD_VA1: 20/20
OD_CYLINDER: -0.75
OD_VA1: 20/20
OD_VA1: 20/20
OS_SPHERE: PLANO
OS_VA1: 20/20-1
OS_CYLINDER: -1.50
OS_SPHERE: +0.25
OS_VA1: 20/20
OD_VA1: 20/20
OS_CYLINDER: -1.00
OS_AXIS: 015
OD_CYLINDER: -0.25
OD_SPHERE: -0.25
OD_AXIS: 150
OD_SPHERE: -0.25
OS_AXIS: 20
OS_AXIS: 30
OD_CYLINDER: -1.00
OS_CYLINDER: -1.25
OS_SPHERE: -0.50
OS_AXIS: 020
OD_SPHERE: +0.25
OS_VA1: 20/20
OS_CYLINDER: -1.50
OD_AXIS: 140
OS_ADD: +1.50
OS_SPHERE: -0.50
OD_AXIS: 150
OS_CYLINDER: -1.50
OD_ADD: +1.50
OD_SPHERE: PLANO
OS_VA1: 20/20
OD_SPHERE: -0.25

## 2023-03-13 ASSESSMENT — AXIALLENGTH_DERIVED
OD_AL: 24.0298
OS_AL: 23.9292
OS_AL: 24.0298
OS_AL: 24.2336
OS_AL: 24.2336
OD_AL: 23.9794
OS_AL: 24.1824
OD_AL: 23.6813

## 2023-03-13 ASSESSMENT — REFRACTION_AUTOREFRACTION
OS_AXIS: 022
OS_CYLINDER: -1.50
OD_SPHERE: -0.25
OD_CYLINDER: -0.75
OD_AXIS: 138
OS_SPHERE: -0.50

## 2023-03-13 ASSESSMENT — VISUAL ACUITY
OD_BCVA: 20/25-1
OS_BCVA: 20/20-1

## 2023-03-13 ASSESSMENT — SPHEQUIV_DERIVED
OD_SPHEQUIV: -0.625
OD_SPHEQUIV: 0.125
OS_SPHEQUIV: -1.25
OS_SPHEQUIV: -0.5
OS_SPHEQUIV: -1.25
OD_SPHEQUIV: -0.625
OD_SPHEQUIV: -0.625
OS_SPHEQUIV: -1.125
OS_SPHEQUIV: -0.75
OD_SPHEQUIV: -0.75

## 2023-03-13 ASSESSMENT — CONFRONTATIONAL VISUAL FIELD TEST (CVF)
OS_FINDINGS: FULL
OD_FINDINGS: FULL

## 2023-03-13 ASSESSMENT — KERATOMETRY
OS_K1POWER_DIOPTERS: 42.50
OS_K2POWER_DIOPTERS: 43.75
OS_AXISANGLE_DEGREES: 108
OD_K2POWER_DIOPTERS: 43.50
METHOD_AUTO_MANUAL: AUTO
OD_K1POWER_DIOPTERS: 42.75
OD_AXISANGLE_DEGREES: 075

## 2023-03-13 ASSESSMENT — LID EXAM ASSESSMENTS
OD_BLEPHARITIS: T
OS_BLEPHARITIS: T

## 2023-04-03 NOTE — ED ADULT NURSE NOTE - NS ED NURSE DISCH DISPOSITION
Left without being seen (saw a nurse but never saw a physician or midlevel provider) Rhombic Flap Text: The defect edges were debeveled with a #15 scalpel blade.  Given the location of the defect and the proximity to free margins a rhombic flap was deemed most appropriate.  Using a sterile surgical marker, an appropriate rhombic flap was drawn incorporating the defect.    The area thus outlined was incised deep to adipose tissue with a #15 scalpel blade.  The skin margins were undermined to an appropriate distance in all directions utilizing iris scissors.

## 2023-05-09 NOTE — H&P PST ADULT - PSYCHIATRIC
detailed exam negative Complex Repair And Flap Additional Text (Will Appearing After The Standard Complex Repair Text): The complex repair was not sufficient to completely close the primary defect. The remaining additional defect was repaired with the flap mentioned below.

## 2024-04-10 ENCOUNTER — OFFICE (OUTPATIENT)
Dept: URBAN - METROPOLITAN AREA CLINIC 35 | Facility: CLINIC | Age: 49
Setting detail: OPHTHALMOLOGY
End: 2024-04-10
Payer: COMMERCIAL

## 2024-04-10 DIAGNOSIS — H02.834: ICD-10-CM

## 2024-04-10 DIAGNOSIS — H25.013: ICD-10-CM

## 2024-04-10 DIAGNOSIS — H43.393: ICD-10-CM

## 2024-04-10 DIAGNOSIS — H01.004: ICD-10-CM

## 2024-04-10 DIAGNOSIS — H01.001: ICD-10-CM

## 2024-04-10 DIAGNOSIS — H20.00: ICD-10-CM

## 2024-04-10 DIAGNOSIS — H02.831: ICD-10-CM

## 2024-04-10 PROCEDURE — 92014 COMPRE OPH EXAM EST PT 1/>: CPT | Performed by: OPHTHALMOLOGY

## 2024-04-10 ASSESSMENT — LID POSITION - DERMATOCHALASIS
OS_DERMATOCHALASIS: LUL 2+
OD_DERMATOCHALASIS: RUL 2+

## 2024-04-10 ASSESSMENT — LID EXAM ASSESSMENTS
OD_BLEPHARITIS: RUL T
OS_BLEPHARITIS: LUL T

## 2025-08-19 ENCOUNTER — DOCTOR'S OFFICE (OUTPATIENT)
Facility: LOCATION | Age: 50
Setting detail: OPHTHALMOLOGY
End: 2025-08-19
Payer: COMMERCIAL

## 2025-08-19 DIAGNOSIS — H20.00: ICD-10-CM

## 2025-08-19 DIAGNOSIS — H52.4: ICD-10-CM

## 2025-08-19 DIAGNOSIS — H01.004: ICD-10-CM

## 2025-08-19 DIAGNOSIS — H02.834: ICD-10-CM

## 2025-08-19 DIAGNOSIS — H25.013: ICD-10-CM

## 2025-08-19 DIAGNOSIS — H01.001: ICD-10-CM

## 2025-08-19 DIAGNOSIS — H43.393: ICD-10-CM

## 2025-08-19 DIAGNOSIS — H02.831: ICD-10-CM

## 2025-08-19 PROCEDURE — 92014 COMPRE OPH EXAM EST PT 1/>: CPT | Performed by: OPHTHALMOLOGY

## 2025-08-19 PROCEDURE — 92015 DETERMINE REFRACTIVE STATE: CPT | Performed by: OPHTHALMOLOGY

## 2025-08-19 ASSESSMENT — REFRACTION_MANIFEST
OD_AXIS: 150
OS_SPHERE: -0.50
OD_CYLINDER: -0.25
OS_AXIS: 023
OS_CYLINDER: -1.25
OD_VA1: 20/20
OD_VA1: 20/20
OD_CYLINDER: -1.00
OS_AXIS: 020
OD_AXIS: 145
OU_VA: 20/20
OD_SPHERE: +0.25
OD_SPHERE: -0.25
OS_ADD: +2.00
OD_AXIS: 150
OS_AXIS: 125
OS_AXIS: 20
OD_VA1: 20/20
OD_ADD: +1.50
OD_VA1: 20/20
OD_CYLINDER: -1.00
OS_VA1: 20/20-1
OD_SPHERE: PLANO
OS_SPHERE: PLANO
OS_AXIS: 30
OS_CYLINDER: -1.50
OD_CYLINDER: -0.75
OS_CYLINDER: -1.50
OS_SPHERE: +0.25
OD_AXIS: 140
OS_CYLINDER: -1.50
OS_SPHERE: PLANO
OD_AXIS: 140
OS_SPHERE: -0.50
OS_VA1: 20/20
OD_SPHERE: PLANO
OS_VA1: 20/20
OS_VA1: 20/20
OS_CYLINDER: -1.25
OD_SPHERE: -0.25
OD_AXIS: 145
OD_SPHERE: PLANO
OS_CYLINDER: -1.50
OS_CYLINDER: -1.00
OS_SPHERE: -0.50
OS_VA1: 20/20
OS_SPHERE: -0.25
OD_CYLINDER: -0.50
OS_CYLINDER: -1.00
OD_SPHERE: -0.25
OS_VA1: 20/20
OD_AXIS: 150
OD_CYLINDER: -0.75
OD_VA1: 20/20
OD_SPHERE: -0.25
OD_VA1: 20/20
OS_ADD: +1.50
OS_AXIS: 025
OD_CYLINDER: -0.75
OS_VA1: 20/20
OD_ADD: +2.00
OD_AXIS: 142
OS_SPHERE: PLANO
OD_CYLINDER: -0.50
OS_AXIS: 020
OS_AXIS: 015
OD_VA1: 20/20

## 2025-08-19 ASSESSMENT — REFRACTION_CURRENTRX
OS_AXIS: 107
OS_CYLINDER: -1.25
OD_OVR_VA: 20/
OS_OVR_VA: 20/
OD_AXIS: 062
OS_SPHERE: +1.00
OS_VPRISM_DIRECTION: SV
OD_VPRISM_DIRECTION: SV
OD_SPHERE: PLANO
OS_CYLINDER: +1.50
OD_SPHERE: +1.50
OS_AXIS: 010
OD_CYLINDER: -0.50
OD_SPHERE: -1.25
OD_CYLINDER: +0.50
OD_CYLINDER: -1.00
OS_AXIS: 108
OS_AXIS: 015
OD_AXIS: 160
OS_VPRISM_DIRECTION: SV
OD_OVR_VA: 20/
OD_AXIS: 052
OD_AXIS: 147
OS_SPHERE: PLANO
OD_VPRISM_DIRECTION: SV
OS_SPHERE: PLANO
OS_CYLINDER: -1.00
OD_OVR_VA: 20/
OS_CYLINDER: +1.25
OD_CYLINDER: +1.00
OD_SPHERE: +1.00
OS_SPHERE: -1.75

## 2025-08-19 ASSESSMENT — LID EXAM ASSESSMENTS
OD_BLEPHARITIS: RUL T
OS_BLEPHARITIS: LUL T

## 2025-08-19 ASSESSMENT — TONOMETRY
OD_IOP_MMHG: 19
OS_IOP_MMHG: 19

## 2025-08-19 ASSESSMENT — KERATOMETRY
OD_K2POWER_DIOPTERS: 43.50
OS_AXISANGLE_DEGREES: 109
OD_K1POWER_DIOPTERS: 42.75
OS_K1POWER_DIOPTERS: 42.50
OS_K2POWER_DIOPTERS: 43.75
OD_AXISANGLE_DEGREES: 076
METHOD_AUTO_MANUAL: AUTO

## 2025-08-19 ASSESSMENT — CONFRONTATIONAL VISUAL FIELD TEST (CVF)
OS_FINDINGS: FULL
OD_FINDINGS: FULL

## 2025-08-19 ASSESSMENT — REFRACTION_AUTOREFRACTION
OD_AXIS: 142
OD_CYLINDER: -0.75
OS_CYLINDER: -1.50
OS_SPHERE: -0.50
OD_SPHERE: -0.25
OS_AXIS: 023

## 2025-08-19 ASSESSMENT — VISUAL ACUITY
OD_BCVA: 20/25-1
OS_BCVA: 20/20

## 2025-08-19 ASSESSMENT — LID POSITION - DERMATOCHALASIS
OS_DERMATOCHALASIS: LUL 2+
OD_DERMATOCHALASIS: RUL 2+